# Patient Record
Sex: MALE | Race: WHITE | NOT HISPANIC OR LATINO | Employment: OTHER | URBAN - METROPOLITAN AREA
[De-identification: names, ages, dates, MRNs, and addresses within clinical notes are randomized per-mention and may not be internally consistent; named-entity substitution may affect disease eponyms.]

---

## 2019-10-07 LAB — HCV AB SER-ACNC: NEGATIVE

## 2019-10-17 LAB — HBA1C MFR BLD HPLC: 5.7 %

## 2020-10-05 ENCOUNTER — OFFICE VISIT (OUTPATIENT)
Dept: FAMILY MEDICINE CLINIC | Facility: CLINIC | Age: 80
End: 2020-10-05
Payer: MEDICARE

## 2020-10-05 VITALS
HEART RATE: 69 BPM | WEIGHT: 172.2 LBS | SYSTOLIC BLOOD PRESSURE: 130 MMHG | BODY MASS INDEX: 26.1 KG/M2 | DIASTOLIC BLOOD PRESSURE: 70 MMHG | RESPIRATION RATE: 16 BRPM | TEMPERATURE: 97.6 F | OXYGEN SATURATION: 95 % | HEIGHT: 68 IN

## 2020-10-05 DIAGNOSIS — F32.A MILD DEPRESSION: ICD-10-CM

## 2020-10-05 DIAGNOSIS — E78.2 MIXED HYPERLIPIDEMIA: Primary | ICD-10-CM

## 2020-10-05 DIAGNOSIS — C61 PROSTATE CANCER (HCC): ICD-10-CM

## 2020-10-05 DIAGNOSIS — K22.70 BARRETT'S ESOPHAGUS WITHOUT DYSPLASIA: ICD-10-CM

## 2020-10-05 PROBLEM — G89.29 CHRONIC BILATERAL LOW BACK PAIN WITHOUT SCIATICA: Status: ACTIVE | Noted: 2020-10-05

## 2020-10-05 PROBLEM — M54.50 CHRONIC BILATERAL LOW BACK PAIN WITHOUT SCIATICA: Status: ACTIVE | Noted: 2020-10-05

## 2020-10-05 PROBLEM — K22.719 BARRETT'S ESOPHAGUS WITH DYSPLASIA: Status: ACTIVE | Noted: 2020-10-05

## 2020-10-05 PROBLEM — G25.81 RESTLESS LEG SYNDROME: Status: ACTIVE | Noted: 2020-10-05

## 2020-10-05 PROCEDURE — 99203 OFFICE O/P NEW LOW 30 MIN: CPT | Performed by: FAMILY MEDICINE

## 2020-10-05 RX ORDER — ROSUVASTATIN CALCIUM 10 MG/1
5 TABLET, COATED ORAL DAILY
Qty: 90 TABLET | Refills: 3 | Status: SHIPPED | OUTPATIENT
Start: 2020-10-05 | End: 2020-10-05 | Stop reason: ALTCHOICE

## 2020-10-05 RX ORDER — ROPINIROLE 2 MG/1
2 TABLET, FILM COATED, EXTENDED RELEASE ORAL DAILY
COMMUNITY
Start: 2020-09-25 | End: 2021-04-27

## 2020-10-05 RX ORDER — TADALAFIL 5 MG/1
5 TABLET ORAL DAILY
COMMUNITY
Start: 2020-08-19

## 2020-10-05 RX ORDER — BUPROPION HYDROCHLORIDE 300 MG/1
TABLET ORAL
COMMUNITY
Start: 2020-09-27

## 2020-10-05 RX ORDER — CLONAZEPAM 0.5 MG/1
0.5 TABLET ORAL
COMMUNITY

## 2020-10-05 RX ORDER — ROPINIROLE 0.5 MG/1
1 TABLET, FILM COATED ORAL DAILY
COMMUNITY
Start: 2020-09-05 | End: 2020-12-27 | Stop reason: HOSPADM

## 2020-10-05 RX ORDER — PANTOPRAZOLE SODIUM 40 MG/1
40 TABLET, DELAYED RELEASE ORAL DAILY
COMMUNITY
Start: 2020-07-30

## 2020-10-05 RX ORDER — ROSUVASTATIN CALCIUM 5 MG/1
5 TABLET, COATED ORAL DAILY
Qty: 90 TABLET | Refills: 3 | Status: SHIPPED | OUTPATIENT
Start: 2020-10-05 | End: 2020-12-03

## 2020-11-27 DIAGNOSIS — Z00.00 WELL ADULT EXAM: Primary | ICD-10-CM

## 2020-12-02 ENCOUNTER — TELEPHONE (OUTPATIENT)
Dept: FAMILY MEDICINE CLINIC | Facility: CLINIC | Age: 80
End: 2020-12-02

## 2020-12-03 ENCOUNTER — HOSPITAL ENCOUNTER (OUTPATIENT)
Dept: ULTRASOUND IMAGING | Facility: HOSPITAL | Age: 80
Discharge: HOME | End: 2020-12-03

## 2020-12-03 ENCOUNTER — APPOINTMENT (OUTPATIENT)
Dept: LAB | Facility: CLINIC | Age: 80
End: 2020-12-03

## 2020-12-03 ENCOUNTER — OFFICE VISIT (OUTPATIENT)
Dept: FAMILY MEDICINE CLINIC | Facility: CLINIC | Age: 80
End: 2020-12-03

## 2020-12-03 ENCOUNTER — TRANSCRIBE ORDERS (OUTPATIENT)
Dept: LAB | Facility: CLINIC | Age: 80
End: 2020-12-03

## 2020-12-03 ENCOUNTER — HOSPITAL ENCOUNTER (OUTPATIENT)
Dept: NON INVASIVE DIAGNOSTICS | Facility: CLINIC | Age: 80
Discharge: HOME | End: 2020-12-03

## 2020-12-03 ENCOUNTER — HOSPITAL ENCOUNTER (OUTPATIENT)
Dept: CT IMAGING | Facility: HOSPITAL | Age: 80
Discharge: HOME | End: 2020-12-03

## 2020-12-03 VITALS
WEIGHT: 170 LBS | HEART RATE: 63 BPM | RESPIRATION RATE: 12 BRPM | OXYGEN SATURATION: 99 % | TEMPERATURE: 96.1 F | HEIGHT: 68 IN | DIASTOLIC BLOOD PRESSURE: 68 MMHG | BODY MASS INDEX: 25.76 KG/M2 | SYSTOLIC BLOOD PRESSURE: 120 MMHG

## 2020-12-03 DIAGNOSIS — R73.9 HYPERGLYCEMIA: ICD-10-CM

## 2020-12-03 DIAGNOSIS — G25.81 RESTLESS LEG SYNDROME: ICD-10-CM

## 2020-12-03 DIAGNOSIS — F51.01 PRIMARY INSOMNIA: ICD-10-CM

## 2020-12-03 DIAGNOSIS — N52.32 ERECTILE DYSFUNCTION AFTER RADICAL CYSTECTOMY: ICD-10-CM

## 2020-12-03 DIAGNOSIS — I77.810 ASCENDING AORTA DILATATION (HCC): ICD-10-CM

## 2020-12-03 DIAGNOSIS — I65.23 BILATERAL CAROTID ARTERY STENOSIS: ICD-10-CM

## 2020-12-03 DIAGNOSIS — Z00.00 WELL ADULT EXAM: ICD-10-CM

## 2020-12-03 DIAGNOSIS — M54.50 CHRONIC BILATERAL LOW BACK PAIN WITHOUT SCIATICA: ICD-10-CM

## 2020-12-03 DIAGNOSIS — E78.2 MIXED HYPERLIPIDEMIA: ICD-10-CM

## 2020-12-03 DIAGNOSIS — I77.1 STENOSIS OF LEFT ILIAC ARTERY (HCC): ICD-10-CM

## 2020-12-03 DIAGNOSIS — K44.9 HIATAL HERNIA: ICD-10-CM

## 2020-12-03 DIAGNOSIS — M67.911 TENDINOPATHY OF RIGHT ROTATOR CUFF: ICD-10-CM

## 2020-12-03 DIAGNOSIS — R93.1 ELEVATED CORONARY ARTERY CALCIUM SCORE: ICD-10-CM

## 2020-12-03 DIAGNOSIS — F32.A MILD DEPRESSION: ICD-10-CM

## 2020-12-03 DIAGNOSIS — G89.29 CHRONIC BILATERAL LOW BACK PAIN WITHOUT SCIATICA: ICD-10-CM

## 2020-12-03 DIAGNOSIS — C61 PROSTATE CANCER (HCC): ICD-10-CM

## 2020-12-03 DIAGNOSIS — K22.70 BARRETT'S ESOPHAGUS WITHOUT DYSPLASIA: ICD-10-CM

## 2020-12-03 DIAGNOSIS — Z00.00 WELL ADULT ON ROUTINE HEALTH CHECK: Primary | ICD-10-CM

## 2020-12-03 PROBLEM — N52.39 POST-PROCEDURAL ERECTILE DYSFUNCTION: Status: ACTIVE | Noted: 2020-12-03

## 2020-12-03 LAB
25(OH)D3 SERPL-MCNC: 52 NG/ML (ref 30–100)
ALBUMIN SERPL BCP-MCNC: 3.5 G/DL (ref 3.5–5)
ALP SERPL-CCNC: 68 U/L (ref 46–116)
ALT SERPL W P-5'-P-CCNC: 49 U/L (ref 12–78)
ANION GAP SERPL CALCULATED.3IONS-SCNC: 8 MMOL/L (ref 4–13)
AST SERPL W P-5'-P-CCNC: 40 U/L (ref 5–45)
ATRIAL RATE: 58 BPM
BACTERIA UR QL AUTO: ABNORMAL /HPF
BASOPHILS # BLD AUTO: 0.04 THOUSANDS/ΜL (ref 0–0.1)
BASOPHILS NFR BLD AUTO: 1 % (ref 0–1)
BILIRUB SERPL-MCNC: 0.69 MG/DL (ref 0.2–1)
BILIRUB UR QL STRIP: ABNORMAL
BUN SERPL-MCNC: 17 MG/DL (ref 5–25)
CALCIUM SERPL-MCNC: 8.6 MG/DL (ref 8.3–10.1)
CHLORIDE SERPL-SCNC: 103 MMOL/L (ref 100–108)
CHOLEST SERPL-MCNC: 130 MG/DL (ref 50–200)
CLARITY UR: CLEAR
CO2 SERPL-SCNC: 25 MMOL/L (ref 21–32)
COLOR UR: YELLOW
CREAT SERPL-MCNC: 1.03 MG/DL (ref 0.6–1.3)
CRP SERPL HS-MCNC: 2.19 MG/L
EOSINOPHIL # BLD AUTO: 0.14 THOUSAND/ΜL (ref 0–0.61)
EOSINOPHIL NFR BLD AUTO: 3 % (ref 0–6)
ERYTHROCYTE [DISTWIDTH] IN BLOOD BY AUTOMATED COUNT: 14 % (ref 11.6–15.1)
EST. AVERAGE GLUCOSE BLD GHB EST-MCNC: 120 MG/DL
GFR SERPL CREATININE-BSD FRML MDRD: 69 ML/MIN/1.73SQ M
GLUCOSE P FAST SERPL-MCNC: 111 MG/DL (ref 65–99)
GLUCOSE UR STRIP-MCNC: NEGATIVE MG/DL
HBA1C MFR BLD: 5.8 %
HCT VFR BLD AUTO: 45.1 % (ref 36.5–49.3)
HDLC SERPL-MCNC: 59 MG/DL
HGB BLD-MCNC: 14.8 G/DL (ref 12–17)
HGB UR QL STRIP.AUTO: NEGATIVE
IMM GRANULOCYTES # BLD AUTO: 0.01 THOUSAND/UL (ref 0–0.2)
IMM GRANULOCYTES NFR BLD AUTO: 0 % (ref 0–2)
KETONES UR STRIP-MCNC: NEGATIVE MG/DL
LDLC SERPL CALC-MCNC: 58 MG/DL (ref 0–100)
LEUKOCYTE ESTERASE UR QL STRIP: NEGATIVE
LYMPHOCYTES # BLD AUTO: 0.99 THOUSANDS/ΜL (ref 0.6–4.47)
LYMPHOCYTES NFR BLD AUTO: 20 % (ref 14–44)
MCH RBC QN AUTO: 32 PG (ref 26.8–34.3)
MCHC RBC AUTO-ENTMCNC: 32.8 G/DL (ref 31.4–37.4)
MCV RBC AUTO: 97 FL (ref 82–98)
MONOCYTES # BLD AUTO: 0.62 THOUSAND/ΜL (ref 0.17–1.22)
MONOCYTES NFR BLD AUTO: 13 % (ref 4–12)
NEUTROPHILS # BLD AUTO: 3.09 THOUSANDS/ΜL (ref 1.85–7.62)
NEUTS SEG NFR BLD AUTO: 63 % (ref 43–75)
NITRITE UR QL STRIP: NEGATIVE
NON-SQ EPI CELLS URNS QL MICRO: ABNORMAL /HPF
NRBC BLD AUTO-RTO: 0 /100 WBCS
P AXIS: 53 DEGREES
PH UR STRIP.AUTO: 8 [PH]
PLATELET # BLD AUTO: 204 THOUSANDS/UL (ref 149–390)
PMV BLD AUTO: 9 FL (ref 8.9–12.7)
POTASSIUM SERPL-SCNC: 4.3 MMOL/L (ref 3.5–5.3)
PR INTERVAL: 232 MS
PROT SERPL-MCNC: 7.1 G/DL (ref 6.4–8.2)
PROT UR STRIP-MCNC: NEGATIVE MG/DL
PSA SERPL-MCNC: <0.1 NG/ML (ref 0–4)
QRS AXIS: -11 DEGREES
QRSD INTERVAL: 94 MS
QT INTERVAL: 406 MS
QTC INTERVAL: 398 MS
RBC # BLD AUTO: 4.63 MILLION/UL (ref 3.88–5.62)
RBC #/AREA URNS AUTO: ABNORMAL /HPF
SODIUM SERPL-SCNC: 136 MMOL/L (ref 136–145)
SP GR UR STRIP.AUTO: 1.02 (ref 1–1.03)
T WAVE AXIS: 18 DEGREES
TRIGL SERPL-MCNC: 66 MG/DL
TSH SERPL DL<=0.05 MIU/L-ACNC: 1.36 UIU/ML (ref 0.36–3.74)
UROBILINOGEN UR QL STRIP.AUTO: 0.2 E.U./DL
VENTRICULAR RATE: 58 BPM
WBC # BLD AUTO: 4.89 THOUSAND/UL (ref 4.31–10.16)
WBC #/AREA URNS AUTO: ABNORMAL /HPF

## 2020-12-03 PROCEDURE — 75571 CT HRT W/O DYE W/CA TEST: CPT

## 2020-12-03 PROCEDURE — 80061 LIPID PANEL: CPT

## 2020-12-03 PROCEDURE — 80053 COMPREHEN METABOLIC PANEL: CPT

## 2020-12-03 PROCEDURE — VASC: Performed by: SURGERY

## 2020-12-03 PROCEDURE — 86141 C-REACTIVE PROTEIN HS: CPT

## 2020-12-03 PROCEDURE — 36415 COLL VENOUS BLD VENIPUNCTURE: CPT

## 2020-12-03 PROCEDURE — 93306 TTE W/DOPPLER COMPLETE: CPT

## 2020-12-03 PROCEDURE — 93350 STRESS TTE ONLY: CPT

## 2020-12-03 PROCEDURE — 83036 HEMOGLOBIN GLYCOSYLATED A1C: CPT

## 2020-12-03 PROCEDURE — 93922 UPR/L XTREMITY ART 2 LEVELS: CPT

## 2020-12-03 PROCEDURE — 82306 VITAMIN D 25 HYDROXY: CPT

## 2020-12-03 PROCEDURE — 93005 ELECTROCARDIOGRAM TRACING: CPT

## 2020-12-03 PROCEDURE — G1004 CDSM NDSC: HCPCS

## 2020-12-03 PROCEDURE — 84443 ASSAY THYROID STIM HORMONE: CPT

## 2020-12-03 PROCEDURE — 93351 STRESS TTE COMPLETE: CPT | Performed by: INTERNAL MEDICINE

## 2020-12-03 PROCEDURE — 76700 US EXAM ABDOM COMPLETE: CPT

## 2020-12-03 PROCEDURE — 81001 URINALYSIS AUTO W/SCOPE: CPT

## 2020-12-03 PROCEDURE — 93010 ELECTROCARDIOGRAM REPORT: CPT | Performed by: INTERNAL MEDICINE

## 2020-12-03 PROCEDURE — 85025 COMPLETE CBC W/AUTO DIFF WBC: CPT

## 2020-12-03 PROCEDURE — 84153 ASSAY OF PSA TOTAL: CPT

## 2020-12-03 PROCEDURE — 93306 TTE W/DOPPLER COMPLETE: CPT | Performed by: INTERNAL MEDICINE

## 2020-12-03 PROCEDURE — 99499EX: Performed by: FAMILY MEDICINE

## 2020-12-03 RX ORDER — ASPIRIN 81 MG/1
81 TABLET ORAL DAILY
Start: 2020-12-03 | End: 2021-01-04

## 2020-12-03 RX ORDER — CYANOCOBALAMIN (VITAMIN B-12) 1000 MCG
TABLET ORAL
Start: 2020-12-03

## 2020-12-03 RX ORDER — MAGNESIUM OXIDE/MAG AA CHELATE 300 MG
CAPSULE ORAL
Refills: 0
Start: 2020-12-03

## 2020-12-03 RX ORDER — ROSUVASTATIN CALCIUM 10 MG/1
10 TABLET, COATED ORAL DAILY
Qty: 90 TABLET | Refills: 3 | Status: SHIPPED | OUTPATIENT
Start: 2020-12-03 | End: 2022-03-22

## 2020-12-03 RX ORDER — NIACIN 500 MG
500 TABLET ORAL
Start: 2020-12-03

## 2020-12-03 RX ORDER — GABAPENTIN 100 MG/1
CAPSULE ORAL
Qty: 90 CAPSULE | Refills: 3 | Status: SHIPPED | OUTPATIENT
Start: 2020-12-03 | End: 2021-05-14

## 2020-12-03 RX ORDER — MELATONIN
1000 DAILY
Start: 2020-12-03

## 2020-12-11 LAB
CHEST PAIN STATEMENT: NORMAL
MAX DIASTOLIC BP: 80 MMHG
MAX HEART RATE: 136 BPM
MAX PREDICTED HEART RATE: 141 BPM
MAX. SYSTOLIC BP: 198 MMHG
PROTOCOL NAME: NORMAL
REASON FOR TERMINATION: NORMAL
TARGET HR FORMULA: NORMAL
TEST INDICATION: NORMAL
TIME IN EXERCISE PHASE: NORMAL

## 2020-12-23 ENCOUNTER — ANESTHESIA (INPATIENT)
Dept: PERIOP | Facility: HOSPITAL | Age: 80
DRG: 470 | End: 2020-12-23
Payer: MEDICARE

## 2020-12-23 ENCOUNTER — APPOINTMENT (INPATIENT)
Dept: RADIOLOGY | Facility: HOSPITAL | Age: 80
DRG: 470 | End: 2020-12-23
Payer: MEDICARE

## 2020-12-23 ENCOUNTER — TELEPHONE (OUTPATIENT)
Dept: FAMILY MEDICINE CLINIC | Facility: CLINIC | Age: 80
End: 2020-12-23

## 2020-12-23 ENCOUNTER — APPOINTMENT (EMERGENCY)
Dept: RADIOLOGY | Facility: HOSPITAL | Age: 80
DRG: 470 | End: 2020-12-23
Payer: MEDICARE

## 2020-12-23 ENCOUNTER — ANESTHESIA EVENT (INPATIENT)
Dept: PERIOP | Facility: HOSPITAL | Age: 80
DRG: 470 | End: 2020-12-23
Payer: MEDICARE

## 2020-12-23 ENCOUNTER — HOSPITAL ENCOUNTER (INPATIENT)
Facility: HOSPITAL | Age: 80
LOS: 4 days | Discharge: HOME | DRG: 470 | End: 2020-12-27
Attending: EMERGENCY MEDICINE | Admitting: INTERNAL MEDICINE
Payer: MEDICARE

## 2020-12-23 VITALS — HEART RATE: 68 BPM

## 2020-12-23 DIAGNOSIS — S72.002A CLOSED FRACTURE OF NECK OF LEFT FEMUR, INITIAL ENCOUNTER (HCC): Primary | ICD-10-CM

## 2020-12-23 DIAGNOSIS — M25.559 HIP PAIN: ICD-10-CM

## 2020-12-23 DIAGNOSIS — Z96.642 STATUS POST TOTAL HIP REPLACEMENT, LEFT: ICD-10-CM
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
## 2020-12-23 DIAGNOSIS — S72.002A FRACTURE OF FEMORAL NECK, LEFT (HCC): ICD-10-CM

## 2020-12-23 PROBLEM — Z01.818 PREOPERATIVE EXAMINATION: Status: ACTIVE | Noted: 2020-12-23

## 2020-12-23 PROBLEM — E78.5 HYPERLIPIDEMIA, UNSPECIFIED: Status: ACTIVE | Noted: 2020-10-05

## 2020-12-23 LAB
25(OH)D3 SERPL-MCNC: 53.1 NG/ML (ref 30–100)
ABO GROUP BLD: NORMAL
ABO GROUP BLD: NORMAL
ALBUMIN SERPL BCP-MCNC: 3.7 G/DL (ref 3.5–5)
ALP SERPL-CCNC: 85 U/L (ref 46–116)
ALT SERPL W P-5'-P-CCNC: 45 U/L (ref 12–78)
ANION GAP SERPL CALCULATED.3IONS-SCNC: 4 MMOL/L (ref 4–13)
APTT PPP: 28 SECONDS (ref 23–37)
AST SERPL W P-5'-P-CCNC: 34 U/L (ref 5–45)
ATRIAL RATE: 65 BPM
BACTERIA UR QL AUTO: ABNORMAL /HPF
BASOPHILS # BLD AUTO: 0.04 THOUSANDS/ΜL (ref 0–0.1)
BASOPHILS NFR BLD AUTO: 1 % (ref 0–1)
BILIRUB SERPL-MCNC: 0.22 MG/DL (ref 0.2–1)
BILIRUB UR QL STRIP: NEGATIVE
BLD GP AB SCN SERPL QL: NEGATIVE
BUN SERPL-MCNC: 24 MG/DL (ref 5–25)
CALCIUM SERPL-MCNC: 9.3 MG/DL (ref 8.3–10.1)
CHLORIDE SERPL-SCNC: 110 MMOL/L (ref 100–108)
CLARITY UR: ABNORMAL
CO2 SERPL-SCNC: 28 MMOL/L (ref 21–32)
COLOR UR: YELLOW
CREAT SERPL-MCNC: 1.01 MG/DL (ref 0.6–1.3)
EOSINOPHIL # BLD AUTO: 0.12 THOUSAND/ΜL (ref 0–0.61)
EOSINOPHIL NFR BLD AUTO: 2 % (ref 0–6)
ERYTHROCYTE [DISTWIDTH] IN BLOOD BY AUTOMATED COUNT: 14 % (ref 11.6–15.1)
ERYTHROCYTE [SEDIMENTATION RATE] IN BLOOD: 24 MM/HOUR (ref 0–19)
FLUAV RNA RESP QL NAA+PROBE: NEGATIVE
FLUBV RNA RESP QL NAA+PROBE: NEGATIVE
GFR SERPL CREATININE-BSD FRML MDRD: 70 ML/MIN/1.73SQ M
GLUCOSE SERPL-MCNC: 100 MG/DL (ref 65–140)
GLUCOSE SERPL-MCNC: 133 MG/DL (ref 65–140)
GLUCOSE UR STRIP-MCNC: NEGATIVE MG/DL
HCT VFR BLD AUTO: 42.2 % (ref 36.5–49.3)
HGB BLD-MCNC: 13.9 G/DL (ref 12–17)
HGB UR QL STRIP.AUTO: ABNORMAL
HYALINE CASTS #/AREA URNS LPF: ABNORMAL /LPF
IMM GRANULOCYTES # BLD AUTO: 0.01 THOUSAND/UL (ref 0–0.2)
IMM GRANULOCYTES NFR BLD AUTO: 0 % (ref 0–2)
INR PPP: 1.07 (ref 0.84–1.19)
KETONES UR STRIP-MCNC: NEGATIVE MG/DL
LEUKOCYTE ESTERASE UR QL STRIP: NEGATIVE
LYMPHOCYTES # BLD AUTO: 1.1 THOUSANDS/ΜL (ref 0.6–4.47)
LYMPHOCYTES NFR BLD AUTO: 17 % (ref 14–44)
MAGNESIUM SERPL-MCNC: 2.3 MG/DL (ref 1.6–2.6)
MCH RBC QN AUTO: 32.1 PG (ref 26.8–34.3)
MCHC RBC AUTO-ENTMCNC: 32.9 G/DL (ref 31.4–37.4)
MCV RBC AUTO: 98 FL (ref 82–98)
MONOCYTES # BLD AUTO: 0.67 THOUSAND/ΜL (ref 0.17–1.22)
MONOCYTES NFR BLD AUTO: 11 % (ref 4–12)
NEUTROPHILS # BLD AUTO: 4.46 THOUSANDS/ΜL (ref 1.85–7.62)
NEUTS SEG NFR BLD AUTO: 69 % (ref 43–75)
NITRITE UR QL STRIP: NEGATIVE
NON-SQ EPI CELLS URNS QL MICRO: ABNORMAL /HPF
NRBC BLD AUTO-RTO: 0 /100 WBCS
P AXIS: 60 DEGREES
PH UR STRIP.AUTO: 7.5 [PH]
PHOSPHATE SERPL-MCNC: 3.6 MG/DL (ref 2.3–4.1)
PLATELET # BLD AUTO: 209 THOUSANDS/UL (ref 149–390)
PMV BLD AUTO: 9.6 FL (ref 8.9–12.7)
POTASSIUM SERPL-SCNC: 4.5 MMOL/L (ref 3.5–5.3)
PR INTERVAL: 240 MS
PROT SERPL-MCNC: 7.3 G/DL (ref 6.4–8.2)
PROT UR STRIP-MCNC: NEGATIVE MG/DL
PROTHROMBIN TIME: 13.9 SECONDS (ref 11.6–14.5)
PTH-INTACT SERPL-MCNC: 44.7 PG/ML (ref 18.4–80.1)
QRS AXIS: 9 DEGREES
QRSD INTERVAL: 96 MS
QT INTERVAL: 392 MS
QTC INTERVAL: 407 MS
RBC # BLD AUTO: 4.33 MILLION/UL (ref 3.88–5.62)
RBC #/AREA URNS AUTO: ABNORMAL /HPF
RH BLD: NEGATIVE
RH BLD: NEGATIVE
RSV RNA RESP QL NAA+PROBE: NEGATIVE
SARS-COV-2 RNA RESP QL NAA+PROBE: NEGATIVE
SODIUM SERPL-SCNC: 142 MMOL/L (ref 136–145)
SP GR UR STRIP.AUTO: 1.02 (ref 1–1.03)
SPECIMEN EXPIRATION DATE: NORMAL
T WAVE AXIS: 58 DEGREES
TSH SERPL DL<=0.05 MIU/L-ACNC: 2.46 UIU/ML (ref 0.36–3.74)
UROBILINOGEN UR QL STRIP.AUTO: 0.2 E.U./DL
VENTRICULAR RATE: 65 BPM
WBC # BLD AUTO: 6.4 THOUSAND/UL (ref 4.31–10.16)
WBC #/AREA URNS AUTO: ABNORMAL /HPF

## 2020-12-23 PROCEDURE — 0241U HB NFCT DS VIR RESP RNA 4 TRGT: CPT | Performed by: ORTHOPAEDIC SURGERY

## 2020-12-23 PROCEDURE — 27130 TOTAL HIP ARTHROPLASTY: CPT | Performed by: ORTHOPAEDIC SURGERY

## 2020-12-23 PROCEDURE — 86900 BLOOD TYPING SEROLOGIC ABO: CPT | Performed by: INTERNAL MEDICINE

## 2020-12-23 PROCEDURE — 81001 URINALYSIS AUTO W/SCOPE: CPT | Performed by: INTERNAL MEDICINE

## 2020-12-23 PROCEDURE — 1124F ACP DISCUSS-NO DSCNMKR DOCD: CPT | Performed by: EMERGENCY MEDICINE

## 2020-12-23 PROCEDURE — 82306 VITAMIN D 25 HYDROXY: CPT | Performed by: ORTHOPAEDIC SURGERY

## 2020-12-23 PROCEDURE — 99222 1ST HOSP IP/OBS MODERATE 55: CPT | Performed by: INTERNAL MEDICINE

## 2020-12-23 PROCEDURE — 93005 ELECTROCARDIOGRAM TRACING: CPT

## 2020-12-23 PROCEDURE — 99223 1ST HOSP IP/OBS HIGH 75: CPT | Performed by: INTERNAL MEDICINE

## 2020-12-23 PROCEDURE — C1776 JOINT DEVICE (IMPLANTABLE): HCPCS | Performed by: ORTHOPAEDIC SURGERY

## 2020-12-23 PROCEDURE — G9197 ORDER FOR CEPH: HCPCS | Performed by: ORTHOPAEDIC SURGERY

## 2020-12-23 PROCEDURE — 0SRB0JA REPLACEMENT OF LEFT HIP JOINT WITH SYNTHETIC SUBSTITUTE, UNCEMENTED, OPEN APPROACH: ICD-10-PCS | Performed by: ORTHOPAEDIC SURGERY

## 2020-12-23 PROCEDURE — 83970 ASSAY OF PARATHORMONE: CPT | Performed by: ORTHOPAEDIC SURGERY

## 2020-12-23 PROCEDURE — 86901 BLOOD TYPING SEROLOGIC RH(D): CPT | Performed by: INTERNAL MEDICINE

## 2020-12-23 PROCEDURE — 85730 THROMBOPLASTIN TIME PARTIAL: CPT | Performed by: INTERNAL MEDICINE

## 2020-12-23 PROCEDURE — 80053 COMPREHEN METABOLIC PANEL: CPT | Performed by: INTERNAL MEDICINE

## 2020-12-23 PROCEDURE — 73501 X-RAY EXAM HIP UNI 1 VIEW: CPT

## 2020-12-23 PROCEDURE — 83735 ASSAY OF MAGNESIUM: CPT | Performed by: INTERNAL MEDICINE

## 2020-12-23 PROCEDURE — 93010 ELECTROCARDIOGRAM REPORT: CPT | Performed by: INTERNAL MEDICINE

## 2020-12-23 PROCEDURE — 71045 X-RAY EXAM CHEST 1 VIEW: CPT

## 2020-12-23 PROCEDURE — 73552 X-RAY EXAM OF FEMUR 2/>: CPT

## 2020-12-23 PROCEDURE — 99232 SBSQ HOSP IP/OBS MODERATE 35: CPT | Performed by: INTERNAL MEDICINE

## 2020-12-23 PROCEDURE — 85652 RBC SED RATE AUTOMATED: CPT | Performed by: ORTHOPAEDIC SURGERY

## 2020-12-23 PROCEDURE — 99285 EMERGENCY DEPT VISIT HI MDM: CPT

## 2020-12-23 PROCEDURE — 84443 ASSAY THYROID STIM HORMONE: CPT | Performed by: ORTHOPAEDIC SURGERY

## 2020-12-23 PROCEDURE — 36415 COLL VENOUS BLD VENIPUNCTURE: CPT | Performed by: INTERNAL MEDICINE

## 2020-12-23 PROCEDURE — NC001 PR NO CHARGE: Performed by: ORTHOPAEDIC SURGERY

## 2020-12-23 PROCEDURE — 86850 RBC ANTIBODY SCREEN: CPT | Performed by: INTERNAL MEDICINE

## 2020-12-23 PROCEDURE — 85610 PROTHROMBIN TIME: CPT | Performed by: INTERNAL MEDICINE

## 2020-12-23 PROCEDURE — 99285 EMERGENCY DEPT VISIT HI MDM: CPT | Performed by: EMERGENCY MEDICINE

## 2020-12-23 PROCEDURE — 73502 X-RAY EXAM HIP UNI 2-3 VIEWS: CPT

## 2020-12-23 PROCEDURE — 84100 ASSAY OF PHOSPHORUS: CPT | Performed by: INTERNAL MEDICINE

## 2020-12-23 PROCEDURE — 82948 REAGENT STRIP/BLOOD GLUCOSE: CPT

## 2020-12-23 PROCEDURE — 85025 COMPLETE CBC W/AUTO DIFF WBC: CPT | Performed by: INTERNAL MEDICINE

## 2020-12-23 DEVICE — ACTIS DUOFIX HIP PROSTHESIS (FEMORAL STEM 12/14 TAPER CEMENTLESS SIZE 6 STD COLLAR)  CE
Type: IMPLANTABLE DEVICE | Site: HIP | Status: FUNCTIONAL
Brand: ACTIS

## 2020-12-23 DEVICE — PINNACLE HIP SOLUTIONS ALTRX POLYETHYLENE ACETABULAR LINER NEUTRAL 36MM ID 52MM OD
Type: IMPLANTABLE DEVICE | Site: HIP | Status: FUNCTIONAL
Brand: PINNACLE ALTRX

## 2020-12-23 DEVICE — M-SPEC METAL FEMORAL HEAD 12/14 TAPER DIAMETER 36MM +1.5: Type: IMPLANTABLE DEVICE | Site: HIP | Status: FUNCTIONAL

## 2020-12-23 DEVICE — PINNACLE POROCOAT ACETABULAR SHELL SECTOR II 52MM OD
Type: IMPLANTABLE DEVICE | Site: HIP | Status: FUNCTIONAL
Brand: PINNACLE POROCOAT

## 2020-12-23 RX ORDER — OXYCODONE HYDROCHLORIDE 5 MG/1
2.5 TABLET ORAL EVERY 4 HOURS PRN
Status: DISCONTINUED | OUTPATIENT
Start: 2020-12-23 | End: 2020-12-27 | Stop reason: HOSPADM

## 2020-12-23 RX ORDER — NIACIN 100 MG
500 TABLET ORAL
Status: DISCONTINUED | OUTPATIENT
Start: 2020-12-23 | End: 2020-12-27 | Stop reason: HOSPADM

## 2020-12-23 RX ORDER — DOCUSATE SODIUM 100 MG/1
100 CAPSULE, LIQUID FILLED ORAL 2 TIMES DAILY
Status: DISCONTINUED | OUTPATIENT
Start: 2020-12-23 | End: 2020-12-27 | Stop reason: HOSPADM

## 2020-12-23 RX ORDER — SENNOSIDES 8.6 MG
1 TABLET ORAL DAILY
Status: DISCONTINUED | OUTPATIENT
Start: 2020-12-23 | End: 2020-12-27 | Stop reason: HOSPADM

## 2020-12-23 RX ORDER — LIDOCAINE HYDROCHLORIDE AND EPINEPHRINE 10; 10 MG/ML; UG/ML
INJECTION, SOLUTION INFILTRATION; PERINEURAL AS NEEDED
Status: DISCONTINUED | OUTPATIENT
Start: 2020-12-23 | End: 2020-12-23 | Stop reason: HOSPADM

## 2020-12-23 RX ORDER — HYDROMORPHONE HCL/PF 1 MG/ML
0.2 SYRINGE (ML) INJECTION EVERY 4 HOURS PRN
Status: DISCONTINUED | OUTPATIENT
Start: 2020-12-23 | End: 2020-12-27 | Stop reason: HOSPADM

## 2020-12-23 RX ORDER — ACETAMINOPHEN 325 MG/1
650 TABLET ORAL EVERY 6 HOURS PRN
Status: DISCONTINUED | OUTPATIENT
Start: 2020-12-23 | End: 2020-12-27 | Stop reason: HOSPADM

## 2020-12-23 RX ORDER — FENTANYL CITRATE 50 UG/ML
INJECTION, SOLUTION INTRAMUSCULAR; INTRAVENOUS AS NEEDED
Status: DISCONTINUED | OUTPATIENT
Start: 2020-12-23 | End: 2020-12-23

## 2020-12-23 RX ORDER — ZINC SULFATE 50(220)MG
220 CAPSULE ORAL DAILY
Status: DISCONTINUED | OUTPATIENT
Start: 2020-12-23 | End: 2020-12-27 | Stop reason: HOSPADM

## 2020-12-23 RX ORDER — ROCURONIUM BROMIDE 10 MG/ML
INJECTION, SOLUTION INTRAVENOUS AS NEEDED
Status: DISCONTINUED | OUTPATIENT
Start: 2020-12-23 | End: 2020-12-23

## 2020-12-23 RX ORDER — MELATONIN
1000 DAILY
Status: DISCONTINUED | OUTPATIENT
Start: 2020-12-23 | End: 2020-12-27 | Stop reason: HOSPADM

## 2020-12-23 RX ORDER — PROPOFOL 10 MG/ML
INJECTION, EMULSION INTRAVENOUS AS NEEDED
Status: DISCONTINUED | OUTPATIENT
Start: 2020-12-23 | End: 2020-12-23

## 2020-12-23 RX ORDER — DEXTROSE, SODIUM CHLORIDE, AND POTASSIUM CHLORIDE 5; .9; .15 G/100ML; G/100ML; G/100ML
125 INJECTION INTRAVENOUS CONTINUOUS
Status: DISCONTINUED | OUTPATIENT
Start: 2020-12-23 | End: 2020-12-23

## 2020-12-23 RX ORDER — PRAVASTATIN SODIUM 80 MG/1
80 TABLET ORAL
Status: DISCONTINUED | OUTPATIENT
Start: 2020-12-23 | End: 2020-12-27 | Stop reason: HOSPADM

## 2020-12-23 RX ORDER — GLYCOPYRROLATE 0.2 MG/ML
INJECTION INTRAMUSCULAR; INTRAVENOUS AS NEEDED
Status: DISCONTINUED | OUTPATIENT
Start: 2020-12-23 | End: 2020-12-23

## 2020-12-23 RX ORDER — CEFAZOLIN SODIUM 2 G/50ML
SOLUTION INTRAVENOUS AS NEEDED
Status: DISCONTINUED | OUTPATIENT
Start: 2020-12-23 | End: 2020-12-23

## 2020-12-23 RX ORDER — CHLORAL HYDRATE 500 MG
1000 CAPSULE ORAL DAILY
Status: DISCONTINUED | OUTPATIENT
Start: 2020-12-23 | End: 2020-12-23

## 2020-12-23 RX ORDER — DOCUSATE SODIUM 100 MG/1
100 CAPSULE, LIQUID FILLED ORAL 2 TIMES DAILY
Status: DISCONTINUED | OUTPATIENT
Start: 2020-12-23 | End: 2020-12-23

## 2020-12-23 RX ORDER — CLONAZEPAM 0.5 MG/1
0.5 TABLET ORAL DAILY
Status: DISCONTINUED | OUTPATIENT
Start: 2020-12-23 | End: 2020-12-27 | Stop reason: HOSPADM

## 2020-12-23 RX ORDER — PANTOPRAZOLE SODIUM 40 MG/1
40 TABLET, DELAYED RELEASE ORAL
Status: DISCONTINUED | OUTPATIENT
Start: 2020-12-23 | End: 2020-12-27 | Stop reason: HOSPADM

## 2020-12-23 RX ORDER — ROPINIROLE 1 MG/1
1 TABLET, FILM COATED ORAL 3 TIMES DAILY
Status: DISCONTINUED | OUTPATIENT
Start: 2020-12-23 | End: 2020-12-27 | Stop reason: HOSPADM

## 2020-12-23 RX ORDER — POTASSIUM CHLORIDE 750 MG/1
10 TABLET, EXTENDED RELEASE ORAL ONCE
Status: DISCONTINUED | OUTPATIENT
Start: 2020-12-23 | End: 2020-12-23

## 2020-12-23 RX ORDER — DOCUSATE SODIUM 100 MG/1
100 CAPSULE, LIQUID FILLED ORAL 2 TIMES DAILY
Qty: 10 CAPSULE | Refills: 0 | Status: SHIPPED | OUTPATIENT
Start: 2020-12-23 | End: 2021-01-04

## 2020-12-23 RX ORDER — ACETAMINOPHEN 325 MG/1
975 TABLET ORAL EVERY 8 HOURS SCHEDULED
Status: DISCONTINUED | OUTPATIENT
Start: 2020-12-23 | End: 2020-12-27 | Stop reason: HOSPADM

## 2020-12-23 RX ORDER — DOCUSATE SODIUM 100 MG/1
100 CAPSULE, LIQUID FILLED ORAL 2 TIMES DAILY PRN
Status: DISCONTINUED | OUTPATIENT
Start: 2020-12-23 | End: 2020-12-27 | Stop reason: HOSPADM

## 2020-12-23 RX ORDER — CHLORHEXIDINE GLUCONATE 0.12 MG/ML
15 RINSE ORAL ONCE
Status: COMPLETED | OUTPATIENT
Start: 2020-12-23 | End: 2020-12-23

## 2020-12-23 RX ORDER — ONDANSETRON 2 MG/ML
4 INJECTION INTRAMUSCULAR; INTRAVENOUS EVERY 6 HOURS PRN
Status: DISCONTINUED | OUTPATIENT
Start: 2020-12-23 | End: 2020-12-27 | Stop reason: HOSPADM

## 2020-12-23 RX ORDER — ONDANSETRON 2 MG/ML
INJECTION INTRAMUSCULAR; INTRAVENOUS AS NEEDED
Status: DISCONTINUED | OUTPATIENT
Start: 2020-12-23 | End: 2020-12-23

## 2020-12-23 RX ORDER — SENNOSIDES 8.6 MG
650 CAPSULE ORAL EVERY 8 HOURS PRN
Qty: 30 TABLET | Refills: 0 | Status: SHIPPED | OUTPATIENT
Start: 2020-12-23 | End: 2021-01-22

## 2020-12-23 RX ORDER — CEFAZOLIN SODIUM 1 G/50ML
1000 SOLUTION INTRAVENOUS EVERY 8 HOURS
Status: COMPLETED | OUTPATIENT
Start: 2020-12-23 | End: 2020-12-24

## 2020-12-23 RX ORDER — TRANEXAMIC ACID 100 MG/ML
INJECTION, SOLUTION INTRAVENOUS AS NEEDED
Status: DISCONTINUED | OUTPATIENT
Start: 2020-12-23 | End: 2020-12-23

## 2020-12-23 RX ORDER — GABAPENTIN 100 MG/1
100 CAPSULE ORAL
Status: DISCONTINUED | OUTPATIENT
Start: 2020-12-23 | End: 2020-12-27 | Stop reason: HOSPADM

## 2020-12-23 RX ORDER — ACETAMINOPHEN 325 MG/1
650 TABLET ORAL EVERY 6 HOURS PRN
Status: DISCONTINUED | OUTPATIENT
Start: 2020-12-23 | End: 2020-12-23

## 2020-12-23 RX ORDER — SODIUM CHLORIDE 9 MG/ML
100 INJECTION, SOLUTION INTRAVENOUS CONTINUOUS
Status: DISCONTINUED | OUTPATIENT
Start: 2020-12-23 | End: 2020-12-23

## 2020-12-23 RX ORDER — GABAPENTIN 100 MG/1
100 CAPSULE ORAL
Status: DISCONTINUED | OUTPATIENT
Start: 2020-12-23 | End: 2020-12-23 | Stop reason: SDUPTHER

## 2020-12-23 RX ORDER — BUPROPION HYDROCHLORIDE 150 MG/1
300 TABLET ORAL DAILY
Status: DISCONTINUED | OUTPATIENT
Start: 2020-12-23 | End: 2020-12-27 | Stop reason: HOSPADM

## 2020-12-23 RX ORDER — ROPINIROLE 1 MG/1
1 TABLET, FILM COATED ORAL DAILY
Status: DISCONTINUED | OUTPATIENT
Start: 2020-12-23 | End: 2020-12-23

## 2020-12-23 RX ORDER — CALCIUM CARBONATE 200(500)MG
1000 TABLET,CHEWABLE ORAL DAILY PRN
Status: DISCONTINUED | OUTPATIENT
Start: 2020-12-23 | End: 2020-12-27 | Stop reason: HOSPADM

## 2020-12-23 RX ORDER — MAGNESIUM HYDROXIDE/ALUMINUM HYDROXICE/SIMETHICONE 120; 1200; 1200 MG/30ML; MG/30ML; MG/30ML
30 SUSPENSION ORAL EVERY 6 HOURS PRN
Status: DISCONTINUED | OUTPATIENT
Start: 2020-12-23 | End: 2020-12-27 | Stop reason: HOSPADM

## 2020-12-23 RX ORDER — DEXAMETHASONE SODIUM PHOSPHATE 10 MG/ML
INJECTION, SOLUTION INTRAMUSCULAR; INTRAVENOUS AS NEEDED
Status: DISCONTINUED | OUTPATIENT
Start: 2020-12-23 | End: 2020-12-23

## 2020-12-23 RX ORDER — SODIUM CHLORIDE, SODIUM GLUCONATE, SODIUM ACETATE, POTASSIUM CHLORIDE, MAGNESIUM CHLORIDE, SODIUM PHOSPHATE, DIBASIC, AND POTASSIUM PHOSPHATE .53; .5; .37; .037; .03; .012; .00082 G/100ML; G/100ML; G/100ML; G/100ML; G/100ML; G/100ML; G/100ML
75 INJECTION, SOLUTION INTRAVENOUS CONTINUOUS
Status: DISCONTINUED | OUTPATIENT
Start: 2020-12-23 | End: 2020-12-24

## 2020-12-23 RX ORDER — OXYCODONE HYDROCHLORIDE 5 MG/1
5 TABLET ORAL EVERY 4 HOURS PRN
Status: DISCONTINUED | OUTPATIENT
Start: 2020-12-23 | End: 2020-12-27 | Stop reason: HOSPADM

## 2020-12-23 RX ORDER — LIDOCAINE HYDROCHLORIDE 10 MG/ML
INJECTION, SOLUTION EPIDURAL; INFILTRATION; INTRACAUDAL; PERINEURAL AS NEEDED
Status: DISCONTINUED | OUTPATIENT
Start: 2020-12-23 | End: 2020-12-23

## 2020-12-23 RX ORDER — FENTANYL CITRATE 50 UG/ML
50 INJECTION, SOLUTION INTRAMUSCULAR; INTRAVENOUS ONCE
Status: COMPLETED | OUTPATIENT
Start: 2020-12-23 | End: 2020-12-23

## 2020-12-23 RX ORDER — MIDAZOLAM HYDROCHLORIDE 2 MG/2ML
INJECTION, SOLUTION INTRAMUSCULAR; INTRAVENOUS AS NEEDED
Status: DISCONTINUED | OUTPATIENT
Start: 2020-12-23 | End: 2020-12-23

## 2020-12-23 RX ORDER — SODIUM CHLORIDE, SODIUM LACTATE, POTASSIUM CHLORIDE, CALCIUM CHLORIDE 600; 310; 30; 20 MG/100ML; MG/100ML; MG/100ML; MG/100ML
INJECTION, SOLUTION INTRAVENOUS CONTINUOUS PRN
Status: DISCONTINUED | OUTPATIENT
Start: 2020-12-23 | End: 2020-12-23

## 2020-12-23 RX ORDER — MAGNESIUM HYDROXIDE 1200 MG/15ML
LIQUID ORAL AS NEEDED
Status: DISCONTINUED | OUTPATIENT
Start: 2020-12-23 | End: 2020-12-23 | Stop reason: HOSPADM

## 2020-12-23 RX ADMIN — CEFAZOLIN SODIUM 1000 MG: 1 SOLUTION INTRAVENOUS at 18:14

## 2020-12-23 RX ADMIN — ROPINIROLE HYDROCHLORIDE 1 MG: 1 TABLET, FILM COATED ORAL at 09:03

## 2020-12-23 RX ADMIN — ROCURONIUM BROMIDE 20 MG: 50 INJECTION, SOLUTION INTRAVENOUS at 14:24

## 2020-12-23 RX ADMIN — ROPINIROLE HYDROCHLORIDE 1 MG: 1 TABLET, FILM COATED ORAL at 21:54

## 2020-12-23 RX ADMIN — ACETAMINOPHEN 975 MG: 325 TABLET, FILM COATED ORAL at 21:54

## 2020-12-23 RX ADMIN — OXYCODONE HYDROCHLORIDE 2.5 MG: 5 TABLET ORAL at 20:15

## 2020-12-23 RX ADMIN — MAGNESIUM OXIDE TAB 400 MG (241.3 MG ELEMENTAL MG) 400 MG: 400 (241.3 MG) TAB at 09:03

## 2020-12-23 RX ADMIN — SUGAMMADEX 200 MG: 100 INJECTION, SOLUTION INTRAVENOUS at 15:19

## 2020-12-23 RX ADMIN — GABAPENTIN 100 MG: 100 CAPSULE ORAL at 21:53

## 2020-12-23 RX ADMIN — PHENYLEPHRINE HYDROCHLORIDE 20 MCG/MIN: 10 INJECTION INTRAVENOUS at 13:45

## 2020-12-23 RX ADMIN — PHENYLEPHRINE HYDROCHLORIDE 100 MCG: 10 INJECTION INTRAVENOUS at 13:43

## 2020-12-23 RX ADMIN — ZINC SULFATE 220 MG (50 MG) CAPSULE 220 MG: CAPSULE at 09:03

## 2020-12-23 RX ADMIN — OXYCODONE HYDROCHLORIDE 5 MG: 5 TABLET ORAL at 04:01

## 2020-12-23 RX ADMIN — CEFAZOLIN SODIUM 2000 MG: 2 SOLUTION INTRAVENOUS at 13:35

## 2020-12-23 RX ADMIN — LIDOCAINE HYDROCHLORIDE 80 MG: 10 INJECTION, SOLUTION EPIDURAL; INFILTRATION; INTRACAUDAL; PERINEURAL at 13:43

## 2020-12-23 RX ADMIN — PROPOFOL 150 MG: 10 INJECTION, EMULSION INTRAVENOUS at 13:43

## 2020-12-23 RX ADMIN — FENTANYL CITRATE 50 MCG: 50 INJECTION INTRAMUSCULAR; INTRAVENOUS at 15:10

## 2020-12-23 RX ADMIN — Medication 60 MG: at 09:04

## 2020-12-23 RX ADMIN — CYANOCOBALAMIN TAB 500 MCG 1000 MCG: 500 TAB at 09:02

## 2020-12-23 RX ADMIN — SODIUM CHLORIDE, SODIUM GLUCONATE, SODIUM ACETATE, POTASSIUM CHLORIDE, MAGNESIUM CHLORIDE, SODIUM PHOSPHATE, DIBASIC, AND POTASSIUM PHOSPHATE 75 ML/HR: .53; .5; .37; .037; .03; .012; .00082 INJECTION, SOLUTION INTRAVENOUS at 16:06

## 2020-12-23 RX ADMIN — ROCURONIUM BROMIDE 30 MG: 50 INJECTION, SOLUTION INTRAVENOUS at 13:44

## 2020-12-23 RX ADMIN — TRANEXAMIC ACID 1000 MG: 1 INJECTION, SOLUTION INTRAVENOUS at 14:15

## 2020-12-23 RX ADMIN — Medication 1000 UNITS: at 09:02

## 2020-12-23 RX ADMIN — ONDANSETRON 4 MG: 2 INJECTION INTRAMUSCULAR; INTRAVENOUS at 14:25

## 2020-12-23 RX ADMIN — PRAVASTATIN SODIUM 80 MG: 80 TABLET ORAL at 18:08

## 2020-12-23 RX ADMIN — Medication 1000 MG: at 09:02

## 2020-12-23 RX ADMIN — PHENYLEPHRINE HYDROCHLORIDE 100 MCG: 10 INJECTION INTRAVENOUS at 13:47

## 2020-12-23 RX ADMIN — HYDROMORPHONE HYDROCHLORIDE 0.2 MG: 1 INJECTION, SOLUTION INTRAMUSCULAR; INTRAVENOUS; SUBCUTANEOUS at 05:34

## 2020-12-23 RX ADMIN — PANTOPRAZOLE SODIUM 40 MG: 40 TABLET, DELAYED RELEASE ORAL at 09:11

## 2020-12-23 RX ADMIN — FENTANYL CITRATE 50 MCG: 50 INJECTION INTRAMUSCULAR; INTRAVENOUS at 02:02

## 2020-12-23 RX ADMIN — POVIDONE-IODINE: 10 SOLUTION TOPICAL at 16:52

## 2020-12-23 RX ADMIN — DEXAMETHASONE SODIUM PHOSPHATE 10 MG: 10 INJECTION, SOLUTION INTRAMUSCULAR; INTRAVENOUS at 13:53

## 2020-12-23 RX ADMIN — FENTANYL CITRATE 50 MCG: 50 INJECTION INTRAMUSCULAR; INTRAVENOUS at 13:43

## 2020-12-23 RX ADMIN — CHLORHEXIDINE GLUCONATE 15 ML: 1.2 RINSE ORAL at 09:02

## 2020-12-23 RX ADMIN — PHENYLEPHRINE HYDROCHLORIDE 100 MCG: 10 INJECTION INTRAVENOUS at 13:45

## 2020-12-23 RX ADMIN — SODIUM CHLORIDE, SODIUM GLUCONATE, SODIUM ACETATE, POTASSIUM CHLORIDE, MAGNESIUM CHLORIDE, SODIUM PHOSPHATE, DIBASIC, AND POTASSIUM PHOSPHATE 75 ML/HR: .53; .5; .37; .037; .03; .012; .00082 INJECTION, SOLUTION INTRAVENOUS at 04:53

## 2020-12-23 RX ADMIN — SODIUM CHLORIDE, SODIUM LACTATE, POTASSIUM CHLORIDE, AND CALCIUM CHLORIDE: .6; .31; .03; .02 INJECTION, SOLUTION INTRAVENOUS at 13:34

## 2020-12-23 RX ADMIN — BUPROPION HYDROCHLORIDE 300 MG: 150 TABLET, EXTENDED RELEASE ORAL at 09:02

## 2020-12-23 RX ADMIN — ACETAMINOPHEN 975 MG: 325 TABLET, FILM COATED ORAL at 06:35

## 2020-12-23 RX ADMIN — CEFAZOLIN SODIUM 1000 MG: 1 SOLUTION INTRAVENOUS at 23:30

## 2020-12-23 RX ADMIN — Medication 500 MG: at 09:04

## 2020-12-23 RX ADMIN — MIDAZOLAM 2 MG: 1 INJECTION INTRAMUSCULAR; INTRAVENOUS at 13:30

## 2020-12-23 RX ADMIN — GLYCOPYRROLATE 0.2 MG: 0.2 INJECTION, SOLUTION INTRAMUSCULAR; INTRAVENOUS at 13:30

## 2020-12-23 NOTE — TELEPHONE ENCOUNTER
PT FELL OUT OF BED LAST NIGHT AND BROKE HIS HIP HE IS IN [facility] THEY WANT TO DO HIP REPLACEMENT SURG  AND HE WANTS TO HOLD OFF UNTIL HE SPEAKS TO YOU  HE WANTS TO KNOW THE NAME OF A SURG  THAT YOU REFER TO  PLEASE CALL HIM [phone]

## 2020-12-24 PROBLEM — Z01.818 PREOPERATIVE EXAMINATION: Status: RESOLVED | Noted: 2020-12-23 | Resolved: 2020-12-24

## 2020-12-24 LAB
ANION GAP SERPL CALCULATED.3IONS-SCNC: 5 MMOL/L (ref 4–13)
BASOPHILS # BLD MANUAL: 0 THOUSAND/UL (ref 0–0.1)
BASOPHILS NFR MAR MANUAL: 0 % (ref 0–1)
BUN SERPL-MCNC: 18 MG/DL (ref 5–25)
CALCIUM SERPL-MCNC: 8.8 MG/DL (ref 8.3–10.1)
CHLORIDE SERPL-SCNC: 109 MMOL/L (ref 100–108)
CO2 SERPL-SCNC: 26 MMOL/L (ref 21–32)
CREAT SERPL-MCNC: 0.94 MG/DL (ref 0.6–1.3)
EOSINOPHIL # BLD MANUAL: 0 THOUSAND/UL (ref 0–0.4)
EOSINOPHIL NFR BLD MANUAL: 0 % (ref 0–6)
ERYTHROCYTE [DISTWIDTH] IN BLOOD BY AUTOMATED COUNT: 14 % (ref 11.6–15.1)
GFR SERPL CREATININE-BSD FRML MDRD: 77 ML/MIN/1.73SQ M
GLUCOSE SERPL-MCNC: 117 MG/DL (ref 65–140)
HCT VFR BLD AUTO: 38.6 % (ref 36.5–49.3)
HGB BLD-MCNC: 12.6 G/DL (ref 12–17)
LYMPHOCYTES # BLD AUTO: 0.48 THOUSAND/UL (ref 0.6–4.47)
LYMPHOCYTES # BLD AUTO: 4 % (ref 14–44)
MCH RBC QN AUTO: 31.9 PG (ref 26.8–34.3)
MCHC RBC AUTO-ENTMCNC: 32.6 G/DL (ref 31.4–37.4)
MCV RBC AUTO: 98 FL (ref 82–98)
MONOCYTES # BLD AUTO: 0.24 THOUSAND/UL (ref 0–1.22)
MONOCYTES NFR BLD: 2 % (ref 4–12)
NEUTROPHILS # BLD MANUAL: 11.09 THOUSAND/UL (ref 1.85–7.62)
NEUTS SEG NFR BLD AUTO: 92 % (ref 43–75)
NRBC BLD AUTO-RTO: 0 /100 WBCS
PLATELET # BLD AUTO: 179 THOUSANDS/UL (ref 149–390)
PLATELET BLD QL SMEAR: ADEQUATE
PMV BLD AUTO: 9.5 FL (ref 8.9–12.7)
POTASSIUM SERPL-SCNC: 4.2 MMOL/L (ref 3.5–5.3)
RBC # BLD AUTO: 3.95 MILLION/UL (ref 3.88–5.62)
RBC MORPH BLD: NORMAL
SODIUM SERPL-SCNC: 140 MMOL/L (ref 136–145)
VARIANT LYMPHS # BLD AUTO: 2 %
WBC # BLD AUTO: 12.05 THOUSAND/UL (ref 4.31–10.16)

## 2020-12-24 PROCEDURE — 85027 COMPLETE CBC AUTOMATED: CPT | Performed by: PHYSICIAN ASSISTANT

## 2020-12-24 PROCEDURE — 99232 SBSQ HOSP IP/OBS MODERATE 35: CPT | Performed by: INTERNAL MEDICINE

## 2020-12-24 PROCEDURE — 85007 BL SMEAR W/DIFF WBC COUNT: CPT | Performed by: PHYSICIAN ASSISTANT

## 2020-12-24 PROCEDURE — 97167 OT EVAL HIGH COMPLEX 60 MIN: CPT

## 2020-12-24 PROCEDURE — 97163 PT EVAL HIGH COMPLEX 45 MIN: CPT

## 2020-12-24 PROCEDURE — NC001 PR NO CHARGE: Performed by: ORTHOPAEDIC SURGERY

## 2020-12-24 PROCEDURE — 80048 BASIC METABOLIC PNL TOTAL CA: CPT | Performed by: PHYSICIAN ASSISTANT

## 2020-12-24 RX ADMIN — DOCUSATE SODIUM 100 MG: 100 CAPSULE ORAL at 17:45

## 2020-12-24 RX ADMIN — BUPROPION HYDROCHLORIDE 300 MG: 150 TABLET, EXTENDED RELEASE ORAL at 08:03

## 2020-12-24 RX ADMIN — Medication 60 MG: at 13:19

## 2020-12-24 RX ADMIN — ZINC SULFATE 220 MG (50 MG) CAPSULE 220 MG: CAPSULE at 08:03

## 2020-12-24 RX ADMIN — ROPINIROLE HYDROCHLORIDE 1 MG: 1 TABLET, FILM COATED ORAL at 15:24

## 2020-12-24 RX ADMIN — Medication 1000 UNITS: at 08:04

## 2020-12-24 RX ADMIN — OXYCODONE HYDROCHLORIDE 2.5 MG: 5 TABLET ORAL at 05:27

## 2020-12-24 RX ADMIN — DOCUSATE SODIUM 100 MG: 100 CAPSULE, LIQUID FILLED ORAL at 13:20

## 2020-12-24 RX ADMIN — B-COMPLEX W/ C & FOLIC ACID TAB 1 TABLET: TAB at 08:03

## 2020-12-24 RX ADMIN — ROPINIROLE HYDROCHLORIDE 1 MG: 1 TABLET, FILM COATED ORAL at 21:52

## 2020-12-24 RX ADMIN — ROPINIROLE HYDROCHLORIDE 1 MG: 1 TABLET, FILM COATED ORAL at 08:03

## 2020-12-24 RX ADMIN — SODIUM CHLORIDE, SODIUM GLUCONATE, SODIUM ACETATE, POTASSIUM CHLORIDE, MAGNESIUM CHLORIDE, SODIUM PHOSPHATE, DIBASIC, AND POTASSIUM PHOSPHATE 75 ML/HR: .53; .5; .37; .037; .03; .012; .00082 INJECTION, SOLUTION INTRAVENOUS at 05:39

## 2020-12-24 RX ADMIN — PRAVASTATIN SODIUM 80 MG: 80 TABLET ORAL at 17:44

## 2020-12-24 RX ADMIN — ACETAMINOPHEN 975 MG: 325 TABLET, FILM COATED ORAL at 15:30

## 2020-12-24 RX ADMIN — ACETAMINOPHEN 975 MG: 325 TABLET, FILM COATED ORAL at 05:22

## 2020-12-24 RX ADMIN — PANTOPRAZOLE SODIUM 40 MG: 40 TABLET, DELAYED RELEASE ORAL at 05:23

## 2020-12-24 RX ADMIN — ENOXAPARIN SODIUM 40 MG: 40 INJECTION SUBCUTANEOUS at 03:30

## 2020-12-24 RX ADMIN — CYANOCOBALAMIN TAB 500 MCG 1000 MCG: 500 TAB at 08:03

## 2020-12-24 RX ADMIN — ACETAMINOPHEN 975 MG: 325 TABLET, FILM COATED ORAL at 21:52

## 2020-12-24 RX ADMIN — Medication 500 MG: at 08:03

## 2020-12-24 RX ADMIN — MAGNESIUM OXIDE TAB 400 MG (241.3 MG ELEMENTAL MG) 400 MG: 400 (241.3 MG) TAB at 08:04

## 2020-12-24 RX ADMIN — GABAPENTIN 100 MG: 100 CAPSULE ORAL at 21:52

## 2020-12-25 PROBLEM — R50.9 FEVER: Status: ACTIVE | Noted: 2020-12-25

## 2020-12-25 LAB
ANION GAP SERPL CALCULATED.3IONS-SCNC: 7 MMOL/L (ref 4–13)
BUN SERPL-MCNC: 19 MG/DL (ref 5–25)
CALCIUM SERPL-MCNC: 9.2 MG/DL (ref 8.3–10.1)
CHLORIDE SERPL-SCNC: 105 MMOL/L (ref 100–108)
CO2 SERPL-SCNC: 26 MMOL/L (ref 21–32)
CREAT SERPL-MCNC: 1.02 MG/DL (ref 0.6–1.3)
ERYTHROCYTE [DISTWIDTH] IN BLOOD BY AUTOMATED COUNT: 14.4 % (ref 11.6–15.1)
GFR SERPL CREATININE-BSD FRML MDRD: 70 ML/MIN/1.73SQ M
GLUCOSE SERPL-MCNC: 100 MG/DL (ref 65–140)
HCT VFR BLD AUTO: 40.4 % (ref 36.5–49.3)
HGB BLD-MCNC: 13.2 G/DL (ref 12–17)
MCH RBC QN AUTO: 31.9 PG (ref 26.8–34.3)
MCHC RBC AUTO-ENTMCNC: 32.7 G/DL (ref 31.4–37.4)
MCV RBC AUTO: 98 FL (ref 82–98)
PLATELET # BLD AUTO: 180 THOUSANDS/UL (ref 149–390)
PMV BLD AUTO: 9.3 FL (ref 8.9–12.7)
POTASSIUM SERPL-SCNC: 4.3 MMOL/L (ref 3.5–5.3)
RBC # BLD AUTO: 4.14 MILLION/UL (ref 3.88–5.62)
SODIUM SERPL-SCNC: 138 MMOL/L (ref 136–145)
WBC # BLD AUTO: 9.82 THOUSAND/UL (ref 4.31–10.16)

## 2020-12-25 PROCEDURE — 80048 BASIC METABOLIC PNL TOTAL CA: CPT | Performed by: PHYSICIAN ASSISTANT

## 2020-12-25 PROCEDURE — 85027 COMPLETE CBC AUTOMATED: CPT | Performed by: PHYSICIAN ASSISTANT

## 2020-12-25 PROCEDURE — 97530 THERAPEUTIC ACTIVITIES: CPT

## 2020-12-25 PROCEDURE — 97112 NEUROMUSCULAR REEDUCATION: CPT

## 2020-12-25 PROCEDURE — 97116 GAIT TRAINING THERAPY: CPT

## 2020-12-25 PROCEDURE — NC001 PR NO CHARGE: Performed by: ORTHOPAEDIC SURGERY

## 2020-12-25 PROCEDURE — 99232 SBSQ HOSP IP/OBS MODERATE 35: CPT | Performed by: INTERNAL MEDICINE

## 2020-12-25 RX ADMIN — ACETAMINOPHEN 975 MG: 325 TABLET, FILM COATED ORAL at 14:01

## 2020-12-25 RX ADMIN — ROPINIROLE HYDROCHLORIDE 1 MG: 1 TABLET, FILM COATED ORAL at 21:31

## 2020-12-25 RX ADMIN — MAGNESIUM OXIDE TAB 400 MG (241.3 MG ELEMENTAL MG) 400 MG: 400 (241.3 MG) TAB at 08:20

## 2020-12-25 RX ADMIN — ACETAMINOPHEN 975 MG: 325 TABLET, FILM COATED ORAL at 06:07

## 2020-12-25 RX ADMIN — Medication 60 MG: at 08:21

## 2020-12-25 RX ADMIN — CYANOCOBALAMIN TAB 500 MCG 1000 MCG: 500 TAB at 08:20

## 2020-12-25 RX ADMIN — Medication 500 MG: at 08:21

## 2020-12-25 RX ADMIN — Medication 1000 UNITS: at 08:20

## 2020-12-25 RX ADMIN — DOCUSATE SODIUM 100 MG: 100 CAPSULE ORAL at 16:47

## 2020-12-25 RX ADMIN — BUPROPION HYDROCHLORIDE 300 MG: 150 TABLET, EXTENDED RELEASE ORAL at 08:20

## 2020-12-25 RX ADMIN — B-COMPLEX W/ C & FOLIC ACID TAB 1 TABLET: TAB at 08:20

## 2020-12-25 RX ADMIN — ACETAMINOPHEN 975 MG: 325 TABLET, FILM COATED ORAL at 21:31

## 2020-12-25 RX ADMIN — ENOXAPARIN SODIUM 40 MG: 40 INJECTION SUBCUTANEOUS at 03:12

## 2020-12-25 RX ADMIN — GABAPENTIN 100 MG: 100 CAPSULE ORAL at 21:31

## 2020-12-25 RX ADMIN — PANTOPRAZOLE SODIUM 40 MG: 40 TABLET, DELAYED RELEASE ORAL at 06:07

## 2020-12-25 RX ADMIN — ZINC SULFATE 220 MG (50 MG) CAPSULE 220 MG: CAPSULE at 08:20

## 2020-12-25 RX ADMIN — SENNOSIDES 8.6 MG: 8.6 TABLET, FILM COATED ORAL at 08:20

## 2020-12-25 RX ADMIN — PRAVASTATIN SODIUM 80 MG: 80 TABLET ORAL at 16:47

## 2020-12-25 RX ADMIN — ROPINIROLE HYDROCHLORIDE 1 MG: 1 TABLET, FILM COATED ORAL at 08:20

## 2020-12-25 RX ADMIN — CLONAZEPAM 0.5 MG: 0.5 TABLET ORAL at 08:20

## 2020-12-25 RX ADMIN — ROPINIROLE HYDROCHLORIDE 1 MG: 1 TABLET, FILM COATED ORAL at 16:47

## 2020-12-25 RX ADMIN — DOCUSATE SODIUM 100 MG: 100 CAPSULE ORAL at 08:20

## 2020-12-26 ENCOUNTER — APPOINTMENT (INPATIENT)
Dept: RADIOLOGY | Facility: HOSPITAL | Age: 80
DRG: 470 | End: 2020-12-26
Payer: MEDICARE

## 2020-12-26 LAB
ANION GAP SERPL CALCULATED.3IONS-SCNC: 3 MMOL/L (ref 4–13)
BUN SERPL-MCNC: 18 MG/DL (ref 5–25)
CALCIUM SERPL-MCNC: 8.8 MG/DL (ref 8.3–10.1)
CHLORIDE SERPL-SCNC: 108 MMOL/L (ref 100–108)
CO2 SERPL-SCNC: 27 MMOL/L (ref 21–32)
CREAT SERPL-MCNC: 1.01 MG/DL (ref 0.6–1.3)
ERYTHROCYTE [DISTWIDTH] IN BLOOD BY AUTOMATED COUNT: 14.4 % (ref 11.6–15.1)
GFR SERPL CREATININE-BSD FRML MDRD: 70 ML/MIN/1.73SQ M
GLUCOSE SERPL-MCNC: 118 MG/DL (ref 65–140)
HCT VFR BLD AUTO: 39.5 % (ref 36.5–49.3)
HGB BLD-MCNC: 12.9 G/DL (ref 12–17)
MCH RBC QN AUTO: 32.2 PG (ref 26.8–34.3)
MCHC RBC AUTO-ENTMCNC: 32.7 G/DL (ref 31.4–37.4)
MCV RBC AUTO: 99 FL (ref 82–98)
PLATELET # BLD AUTO: 176 THOUSANDS/UL (ref 149–390)
PMV BLD AUTO: 10 FL (ref 8.9–12.7)
POTASSIUM SERPL-SCNC: 4.4 MMOL/L (ref 3.5–5.3)
RBC # BLD AUTO: 4.01 MILLION/UL (ref 3.88–5.62)
SODIUM SERPL-SCNC: 138 MMOL/L (ref 136–145)
WBC # BLD AUTO: 8.04 THOUSAND/UL (ref 4.31–10.16)

## 2020-12-26 PROCEDURE — 99024 POSTOP FOLLOW-UP VISIT: CPT | Performed by: PHYSICIAN ASSISTANT

## 2020-12-26 PROCEDURE — 85027 COMPLETE CBC AUTOMATED: CPT | Performed by: PHYSICIAN ASSISTANT

## 2020-12-26 PROCEDURE — 71046 X-RAY EXAM CHEST 2 VIEWS: CPT

## 2020-12-26 PROCEDURE — 80048 BASIC METABOLIC PNL TOTAL CA: CPT | Performed by: PHYSICIAN ASSISTANT

## 2020-12-26 PROCEDURE — 99232 SBSQ HOSP IP/OBS MODERATE 35: CPT | Performed by: INTERNAL MEDICINE

## 2020-12-26 RX ADMIN — ROPINIROLE HYDROCHLORIDE 1 MG: 1 TABLET, FILM COATED ORAL at 21:20

## 2020-12-26 RX ADMIN — CYANOCOBALAMIN TAB 500 MCG 1000 MCG: 500 TAB at 08:18

## 2020-12-26 RX ADMIN — ACETAMINOPHEN 975 MG: 325 TABLET, FILM COATED ORAL at 21:20

## 2020-12-26 RX ADMIN — BUPROPION HYDROCHLORIDE 300 MG: 150 TABLET, EXTENDED RELEASE ORAL at 08:18

## 2020-12-26 RX ADMIN — Medication 60 MG: at 08:19

## 2020-12-26 RX ADMIN — GABAPENTIN 100 MG: 100 CAPSULE ORAL at 21:20

## 2020-12-26 RX ADMIN — ROPINIROLE HYDROCHLORIDE 1 MG: 1 TABLET, FILM COATED ORAL at 17:04

## 2020-12-26 RX ADMIN — DOCUSATE SODIUM 100 MG: 100 CAPSULE ORAL at 17:04

## 2020-12-26 RX ADMIN — ZINC SULFATE 220 MG (50 MG) CAPSULE 220 MG: CAPSULE at 08:17

## 2020-12-26 RX ADMIN — CLONAZEPAM 0.5 MG: 0.5 TABLET ORAL at 08:17

## 2020-12-26 RX ADMIN — Medication 500 MG: at 08:19

## 2020-12-26 RX ADMIN — PANTOPRAZOLE SODIUM 40 MG: 40 TABLET, DELAYED RELEASE ORAL at 05:52

## 2020-12-26 RX ADMIN — Medication 1000 UNITS: at 08:17

## 2020-12-26 RX ADMIN — ROPINIROLE HYDROCHLORIDE 1 MG: 1 TABLET, FILM COATED ORAL at 08:17

## 2020-12-26 RX ADMIN — MAGNESIUM OXIDE TAB 400 MG (241.3 MG ELEMENTAL MG) 400 MG: 400 (241.3 MG) TAB at 08:18

## 2020-12-26 RX ADMIN — B-COMPLEX W/ C & FOLIC ACID TAB 1 TABLET: TAB at 08:17

## 2020-12-26 RX ADMIN — DOCUSATE SODIUM 100 MG: 100 CAPSULE ORAL at 08:17

## 2020-12-26 RX ADMIN — ENOXAPARIN SODIUM 40 MG: 40 INJECTION SUBCUTANEOUS at 08:18

## 2020-12-26 RX ADMIN — DOCUSATE SODIUM 100 MG: 100 CAPSULE, LIQUID FILLED ORAL at 05:59

## 2020-12-26 RX ADMIN — SENNOSIDES 8.6 MG: 8.6 TABLET, FILM COATED ORAL at 08:17

## 2020-12-26 RX ADMIN — PRAVASTATIN SODIUM 80 MG: 80 TABLET ORAL at 17:04

## 2020-12-26 RX ADMIN — ACETAMINOPHEN 975 MG: 325 TABLET, FILM COATED ORAL at 05:52

## 2020-12-27 VITALS
OXYGEN SATURATION: 95 % | RESPIRATION RATE: 20 BRPM | BODY MASS INDEX: 28.24 KG/M2 | DIASTOLIC BLOOD PRESSURE: 70 MMHG | TEMPERATURE: 98.9 F | SYSTOLIC BLOOD PRESSURE: 141 MMHG | HEART RATE: 85 BPM | WEIGHT: 179.9 LBS | HEIGHT: 67 IN

## 2020-12-27 PROCEDURE — 99239 HOSP IP/OBS DSCHRG MGMT >30: CPT | Performed by: INTERNAL MEDICINE

## 2020-12-27 RX ADMIN — Medication 60 MG: at 10:40

## 2020-12-27 RX ADMIN — ACETAMINOPHEN 975 MG: 325 TABLET, FILM COATED ORAL at 06:23

## 2020-12-27 RX ADMIN — ENOXAPARIN SODIUM 40 MG: 40 INJECTION SUBCUTANEOUS at 10:39

## 2020-12-27 RX ADMIN — MAGNESIUM OXIDE TAB 400 MG (241.3 MG ELEMENTAL MG) 400 MG: 400 (241.3 MG) TAB at 10:39

## 2020-12-27 RX ADMIN — CLONAZEPAM 0.5 MG: 0.5 TABLET ORAL at 10:38

## 2020-12-27 RX ADMIN — PANTOPRAZOLE SODIUM 40 MG: 40 TABLET, DELAYED RELEASE ORAL at 06:23

## 2020-12-27 RX ADMIN — SENNOSIDES 8.6 MG: 8.6 TABLET, FILM COATED ORAL at 10:40

## 2020-12-27 RX ADMIN — Medication 1000 UNITS: at 10:38

## 2020-12-27 RX ADMIN — ZINC SULFATE 220 MG (50 MG) CAPSULE 220 MG: CAPSULE at 10:39

## 2020-12-27 RX ADMIN — DOCUSATE SODIUM 100 MG: 100 CAPSULE ORAL at 10:39

## 2020-12-27 RX ADMIN — CYANOCOBALAMIN TAB 500 MCG 1000 MCG: 500 TAB at 10:39

## 2020-12-27 RX ADMIN — BUPROPION HYDROCHLORIDE 300 MG: 150 TABLET, EXTENDED RELEASE ORAL at 10:38

## 2020-12-27 RX ADMIN — B-COMPLEX W/ C & FOLIC ACID TAB 1 TABLET: TAB at 10:39

## 2020-12-27 RX ADMIN — Medication 500 MG: at 10:40

## 2020-12-27 RX ADMIN — ROPINIROLE HYDROCHLORIDE 1 MG: 1 TABLET, FILM COATED ORAL at 10:38

## 2020-12-28 ENCOUNTER — TELEPHONE (OUTPATIENT)
Dept: OBGYN CLINIC | Facility: HOSPITAL | Age: 80
End: 2020-12-28

## 2020-12-29 ENCOUNTER — TRANSITIONAL CARE MANAGEMENT (OUTPATIENT)
Dept: FAMILY MEDICINE CLINIC | Facility: CLINIC | Age: 80
End: 2020-12-29

## 2021-01-04 ENCOUNTER — OFFICE VISIT (OUTPATIENT)
Dept: FAMILY MEDICINE CLINIC | Facility: CLINIC | Age: 81
End: 2021-01-04
Payer: MEDICARE

## 2021-01-04 VITALS
BODY MASS INDEX: 27.75 KG/M2 | HEART RATE: 67 BPM | HEIGHT: 67 IN | DIASTOLIC BLOOD PRESSURE: 86 MMHG | SYSTOLIC BLOOD PRESSURE: 150 MMHG | RESPIRATION RATE: 12 BRPM | OXYGEN SATURATION: 97 % | WEIGHT: 176.8 LBS

## 2021-01-04 DIAGNOSIS — Z09 HOSPITAL DISCHARGE FOLLOW-UP: Primary | ICD-10-CM

## 2021-01-04 DIAGNOSIS — R03.0 ELEVATED BLOOD PRESSURE READING: ICD-10-CM

## 2021-01-04 PROCEDURE — 99495 TRANSJ CARE MGMT MOD F2F 14D: CPT | Performed by: INTERNAL MEDICINE

## 2021-01-04 NOTE — PROGRESS NOTES
Assessment/Plan:    No problem-specific Assessment & Plan notes found for this encounter  Diagnoses and all orders for this visit:    Hospital discharge follow-up    Elevated blood pressure reading  -     Blood Pressure Monitoring KIT; Use 2 (two) times a day Omron, upper arm, regular size          Patient doing okay after left total hip arthroplasty  He follows up with orthopedics  He is currently on Lovenox for DVT prophylaxis  BMP in cell count was within normal limits before discharge  His blood pressure reading was slightly elevated today, will give him script for blood pressure monitor and he will check his blood pressures at home twice a day and he will report me the numbers  Subjective:      Patient ID: Shaggy Dudley is a 79 y o  male  Patient came today for TCM visit after his hospital stay because of the left he fracture, he underwent left total hip arthroplasty on 12 03/20/2020  He follows up with orthopedics  He has appointment scheduled for tomorrow  He reports that he has 3/10 intensity pain there that is well controlled with Tylenol  Denies any chills or fevers, no chest pain, palpitation, abdominal pain or urinary symptoms  He reports that his left lower extremity slightly swollen but he is currently on Lovenox for DVT prophylaxis  The following portions of the patient's history were reviewed and updated as appropriate: allergies, current medications, past family history, past medical history, past social history, past surgical history, and problem list     Review of Systems   Constitutional: Negative for chills, fatigue and fever  Respiratory: Negative for cough, chest tightness and shortness of breath  Cardiovascular: Negative for chest pain, palpitations and leg swelling  Gastrointestinal: Negative for abdominal distention, abdominal pain, blood in stool, constipation, diarrhea and nausea  Genitourinary: Negative for difficulty urinating and dysuria  Musculoskeletal: Positive for arthralgias (Left hip)  Neurological: Negative for dizziness, weakness, numbness and headaches  Psychiatric/Behavioral: Negative for agitation  Objective:      /86 (BP Location: Left arm, Patient Position: Sitting, Cuff Size: Standard)   Pulse 67   Resp 12   Ht 5' 7" (1 702 m)   Wt 80 2 kg (176 lb 12 8 oz)   SpO2 97%   BMI 27 69 kg/m²          Physical Exam          Current Outpatient Medications:     acetaminophen (TYLENOL) 650 mg CR tablet, Take 1 tablet (650 mg total) by mouth every 8 (eight) hours as needed for mild pain, Disp: 30 tablet, Rfl: 0    b complex vitamins tablet, Be 5/B6 100/500, Disp: , Rfl:     buPROPion (WELLBUTRIN XL) 300 mg 24 hr tablet, , Disp: , Rfl:     cholecalciferol (VITAMIN D3) 1,000 units tablet, Take 1 tablet (1,000 Units total) by mouth daily, Disp:  , Rfl:     Chromium 500 MCG TABS, One tablet daily, Disp:  , Rfl: 0    clonazePAM (KlonoPIN) 0 5 mg tablet, Take 0 5 mg by mouth daily at bedtime as needed for anxiety , Disp: , Rfl:     co-enzyme Q-10 50 MG capsule, Take 1 capsule (50 mg total) by mouth daily, Disp:  , Rfl:     Cyanocobalamin (B-12) 1000 MCG TABS, One tab daily, Disp:  , Rfl:     enoxaparin (LOVENOX) 40 mg/0 4 mL, 1 subcutaneous injection daily x 28 days AFTER surgery, Disp: 28 Syringe, Rfl: 0    gabapentin (NEURONTIN) 100 mg capsule, Take 1 tablet at bedtime  Titrate up to 3 tablets nightly as needed  , Disp: 90 capsule, Rfl: 3    Magnesium 300 MG CAPS, One tab daily, Disp:  , Rfl: 0    niacin 500 mg tablet, Take 1 tablet (500 mg total) by mouth daily with breakfast, Disp:  , Rfl:     Omega-3 Fatty Acids (Fish Oil) 1200 MG CPDR, bid, Disp:  , Rfl:     pantoprazole (PROTONIX) 40 mg tablet, Take 40 mg by mouth daily, Disp: , Rfl:     Potassium 99 MG TABS, One tab daily, Disp: 330 each, Rfl: 0    rOPINIRole (REQUIP XL) 2 MG 24 hr tablet, Take 2 mg by mouth daily, Disp: , Rfl:     rosuvastatin (CRESTOR) 10 MG tablet, Take 1 tablet (10 mg total) by mouth daily, Disp: 90 tablet, Rfl: 3    tadalafil (CIALIS) 5 MG tablet, Take 5 mg by mouth daily As directed, Disp: , Rfl:     Zinc 50 MG CAPS, Zinc/copper supplement 1 tab daily  , Disp:  , Rfl: 0    Blood Pressure Monitoring KIT, Use 2 (two) times a day Omron, upper arm, regular size, Disp: 1 kit, Rfl: 0       TCM Call (since 12/4/2020)     Date and time call was made  12/29/2020  1:26 PM    Hospital care reviewed  Records reviewed    Patient was hospitialized at  [facility]        Date of Admission  12/23/20    Date of discharge  12/27/20    Diagnosis  closed fracture of neck of left femur    Disposition  Home    Were the patients medications reviewed and updated  Yes      TCM Call (since 12/4/2020)     Scheduled for follow up?   Yes    Did you obtain your prescribed medications  Yes    Do you need help managing your prescriptions or medications  No    Is transportation to your appointment needed  No    I have advised the patient to call PCP with any new or worsening symptoms  Kaye Ja RMA     Living Arrangements  Spouse or Significiant other    Support System  Spouse    The type of support provided  Emotional; Financial; Physical    Do you have social support  Yes, as much as I need

## 2021-01-05 ENCOUNTER — OFFICE VISIT (OUTPATIENT)
Dept: OBGYN CLINIC | Facility: HOSPITAL | Age: 81
End: 2021-01-05

## 2021-01-05 ENCOUNTER — HOSPITAL ENCOUNTER (OUTPATIENT)
Dept: RADIOLOGY | Facility: HOSPITAL | Age: 81
Discharge: HOME | End: 2021-01-05
Attending: ORTHOPAEDIC SURGERY
Payer: MEDICARE

## 2021-01-05 VITALS
SYSTOLIC BLOOD PRESSURE: 131 MMHG | BODY MASS INDEX: 27.62 KG/M2 | HEIGHT: 67 IN | DIASTOLIC BLOOD PRESSURE: 73 MMHG | WEIGHT: 176 LBS | HEART RATE: 69 BPM

## 2021-01-05 DIAGNOSIS — Z47.1 AFTERCARE FOLLOWING LEFT HIP JOINT REPLACEMENT SURGERY: Primary | ICD-10-CM

## 2021-01-05 DIAGNOSIS — Z47.1 AFTERCARE FOLLOWING LEFT HIP JOINT REPLACEMENT SURGERY: ICD-10-CM

## 2021-01-05 DIAGNOSIS — Z96.642 AFTERCARE FOLLOWING LEFT HIP JOINT REPLACEMENT SURGERY: Primary | ICD-10-CM

## 2021-01-05 DIAGNOSIS — R26.81 GAIT INSTABILITY: ICD-10-CM

## 2021-01-05 DIAGNOSIS — Z96.642 AFTERCARE FOLLOWING LEFT HIP JOINT REPLACEMENT SURGERY: ICD-10-CM

## 2021-01-05 PROCEDURE — 73502 X-RAY EXAM HIP UNI 2-3 VIEWS: CPT

## 2021-01-05 PROCEDURE — 99024 POSTOP FOLLOW-UP VISIT: CPT | Performed by: ORTHOPAEDIC SURGERY

## 2021-01-05 RX ORDER — AMOXICILLIN 500 MG/1
CAPSULE ORAL
Qty: 4 CAPSULE | Refills: 2 | Status: SHIPPED | OUTPATIENT
Start: 2021-01-05 | End: 2021-02-09 | Stop reason: SDUPTHER

## 2021-01-05 NOTE — PATIENT INSTRUCTIONS
Plan:    * The xrays of the left hip was review with the patient and his wife  * He can shower and pad dry, the residual glue will come out  * Referral for physical therapy to start strengthening the quadriceps, gait training and balance  * Emphasized anterior hip precaution of getting the hip in a extended position  * He will get back to his normal activities around 3 months and progress to 18 months  * As long as he something in his stomach, taking an IBU and tylenol can be taken together  * Complete another 14 days of Lovenox, leave the rest    * Reminder to wait at least 2 months after the surgery for a dental cleaning and procedure  Prior to any cleaning or procedures; take antibiotic 1 hour prior to the visit  * Amoxicillin 500m tablets 1 hour prior to dental cleaning and procedures  * Ordered a cane to be able to transition off the walker

## 2021-01-05 NOTE — PROGRESS NOTES
Assessment:   Diagnosis ICD-10-CM Associated Orders   1  Gait instability  R26 81 Cane   2  Aftercare following left hip joint replacement surgery  Z47 1 XR hip/pelv 2-3 vws left if performed    Z96 642 Ambulatory referral to Physical Therapy     Cane     amoxicillin (AMOXIL) 500 mg capsule       Plan:    * The xrays of the left hip was review with the patient and his wife  * He can shower and pad dry, the residual glue will come out  * Referral for physical therapy to start strengthening the quadriceps, gait training and balance  * Emphasized anterior hip precaution of getting the hip in a extended position  * He will get back to his normal activities around 3 months and progress to 18 months  * As long as he something in his stomach, taking an IBU and tylenol can be taken together  * Complete another 14 days of Lovenox, leave the rest    * Reminder to wait at least 2 months after the surgery for a dental cleaning and procedure  Prior to any cleaning or procedures; take antibiotic 1 hour prior to the visit  * Amoxicillin 500m tablets 1 hour prior to dental cleaning and procedures  * Ordered a cane to be able to transition off the walker  To do next visit:  Return in about 10 weeks (around 3/16/2021) for left hip with xrays  The above stated was discussed in layman's terms and the patient expressed understanding  All questions were answered to the patient's satisfaction  Scribe Attestation    I,:  Dontae Dumont am acting as a scribe while in the presence of the attending physician :       I,:  Florian Silva MD personally performed the services described in this documentation    as scribed in my presence :             Subjective:   Vasiliy Sidhu is a 79 y o  male who presents today for his 1st postop visit for his left hip  He is 13 days surgery post anterior left total hip, 2020  He is accompanied with his wife today  He ambulates with a walker today   He has no complaints  He has some residual numbness along the incision  He has some soreness when he tried to put his socks on  Review of systems negative unless otherwise specified in HPI  Review of Systems   Constitutional: Negative for chills, fever and unexpected weight change  HENT: Negative for hearing loss, nosebleeds and sore throat  Eyes: Negative for pain, redness and visual disturbance  Respiratory: Negative for cough, shortness of breath and wheezing  Cardiovascular: Negative for chest pain, palpitations and leg swelling  Gastrointestinal: Negative for abdominal pain, nausea and vomiting  Endocrine: Negative for polydipsia and polyuria  Genitourinary: Negative for dysuria and hematuria  Skin: Negative for rash and wound  Neurological: Negative for dizziness, light-headedness and headaches  Psychiatric/Behavioral: Negative for decreased concentration, dysphoric mood and suicidal ideas  The patient is not nervous/anxious          Past Medical History:   Diagnosis Date    Measles 1952    Pericarditis 1980       Past Surgical History:   Procedure Laterality Date    HIP ARTHROPLASTY Left 12/23/2020    Procedure: ANTERIOR APPROACH ARTHROPLASTY HIP TOTAL;  Surgeon: Annemarie Draper MD;  Location: [facility];  Service: Orthopedics    INGUINAL HERNIA REPAIR Bilateral     1995    KNEE SURGERY      knee arthroscopy    PROSTATE SURGERY  2002    Prostatectomy    SPINE SURGERY      Micro diskectomy November 2010 and lumbar laminectomy [facility] [place] [facility]       Family History   Problem Relation Age of Onset    Stroke Mother     Hypertension Mother     Prostate cancer Brother     Glaucoma Brother     No Known Problems Daughter     Stroke Maternal Grandmother     Stroke Maternal Aunt     Coronary artery disease Brother     No Known Problems Daughter        Social History     Occupational History    Not on file   Tobacco Use    Smoking status: Never Smoker    Smokeless tobacco: Never Used   Substance and Sexual Activity    Alcohol use: Not Currently     Comment: Recovering alcoholic  Quit drinking 1992   Drug use: Never    Sexual activity: Not Currently         Current Outpatient Medications:     acetaminophen (TYLENOL) 650 mg CR tablet, Take 1 tablet (650 mg total) by mouth every 8 (eight) hours as needed for mild pain, Disp: 30 tablet, Rfl: 0    b complex vitamins tablet, Be 5/B6 100/500, Disp: , Rfl:     Blood Pressure Monitoring KIT, Use 2 (two) times a day Omron, upper arm, regular size, Disp: 1 kit, Rfl: 0    buPROPion (WELLBUTRIN XL) 300 mg 24 hr tablet, , Disp: , Rfl:     cholecalciferol (VITAMIN D3) 1,000 units tablet, Take 1 tablet (1,000 Units total) by mouth daily, Disp:  , Rfl:     Chromium 500 MCG TABS, One tablet daily, Disp:  , Rfl: 0    clonazePAM (KlonoPIN) 0 5 mg tablet, Take 0 5 mg by mouth daily at bedtime as needed for anxiety , Disp: , Rfl:     co-enzyme Q-10 50 MG capsule, Take 1 capsule (50 mg total) by mouth daily, Disp:  , Rfl:     Cyanocobalamin (B-12) 1000 MCG TABS, One tab daily, Disp:  , Rfl:     enoxaparin (LOVENOX) 40 mg/0 4 mL, 1 subcutaneous injection daily x 28 days AFTER surgery, Disp: 28 Syringe, Rfl: 0    gabapentin (NEURONTIN) 100 mg capsule, Take 1 tablet at bedtime  Titrate up to 3 tablets nightly as needed  , Disp: 90 capsule, Rfl: 3    Magnesium 300 MG CAPS, One tab daily, Disp:  , Rfl: 0    niacin 500 mg tablet, Take 1 tablet (500 mg total) by mouth daily with breakfast, Disp:  , Rfl:     Omega-3 Fatty Acids (Fish Oil) 1200 MG CPDR, bid, Disp:  , Rfl:     pantoprazole (PROTONIX) 40 mg tablet, Take 40 mg by mouth daily, Disp: , Rfl:     Potassium 99 MG TABS, One tab daily, Disp: 330 each, Rfl: 0    rOPINIRole (REQUIP XL) 2 MG 24 hr tablet, Take 2 mg by mouth daily, Disp: , Rfl:     rosuvastatin (CRESTOR) 10 MG tablet, Take 1 tablet (10 mg total) by mouth daily, Disp: 90 tablet, Rfl: 3    tadalafil (CIALIS) 5 MG tablet, Take 5 mg by mouth daily As directed, Disp: , Rfl:     Zinc 50 MG CAPS, Zinc/copper supplement 1 tab daily  , Disp:  , Rfl: 0    Allergies   Allergen Reactions    Other      Hayfever, tree pollen  Vitals:    01/05/21 1326   BP: 131/73   Pulse: 69       Objective:                    Left Hip Exam     Tenderness   Left hip tenderness location: mild along the incision  Range of Motion   Flexion: 100   External rotation: normal   Internal rotation: normal     Muscle Strength   Abduction: 5/5   Adduction: 5/5   Flexion: 5/5     Other   Erythema: absent  Sensation: normal  Pulse: present    Comments:  Calf is soft and tender            Diagnostics, reviewed and taken today if performed as documented: The attending physician has personally reviewed the pertinent films in PACS and interpretation is as follows:  Xrays of the left hip:  Implants in place  No sign of loosening  Procedures, if performed today:    Procedures    None performed      Portions of the record may have been created with voice recognition software  Occasional wrong word or "sound a like" substitutions may have occurred due to the inherent limitations of voice recognition software  Read the chart carefully and recognize, using context, where substitutions have occurred

## 2021-01-07 ENCOUNTER — EVALUATION (OUTPATIENT)
Dept: PHYSICAL THERAPY | Facility: CLINIC | Age: 81
End: 2021-01-07
Payer: MEDICARE

## 2021-01-07 DIAGNOSIS — Z47.1 AFTERCARE FOLLOWING LEFT HIP JOINT REPLACEMENT SURGERY: ICD-10-CM

## 2021-01-07 DIAGNOSIS — Z96.642 AFTERCARE FOLLOWING LEFT HIP JOINT REPLACEMENT SURGERY: ICD-10-CM

## 2021-01-07 PROCEDURE — 97110 THERAPEUTIC EXERCISES: CPT | Performed by: PHYSICAL THERAPIST

## 2021-01-07 PROCEDURE — 97161 PT EVAL LOW COMPLEX 20 MIN: CPT | Performed by: PHYSICAL THERAPIST

## 2021-01-07 NOTE — PROGRESS NOTES
PT Evaluation     Today's date: 2021  Patient name: Eladia Hubbard  : 1940  MRN: [id number]  Referring provider: Meng Kraft MD  Dx:   Encounter Diagnosis     ICD-10-CM    1  Aftercare following left hip joint replacement surgery  Z47 1 Ambulatory referral to Physical Therapy    Z96 642                   Assessment  Assessment details: Eladia Hubbard is a 79 y o  male presenting status post L total hip arthroplasty following a traumatic fall off the bed  He presents with the following impairments including: impaired hip strength, limited ROM, balance deficits, abnormal gait, difficulty ambulating, and pain with function  Additionally, he has functional deficits including: difficulty putting on his socks, difficulty navigating steps, difficulty with prolonged ambulation, inability to sit for prolonged periods, and inability to walk dog without fear of falls  He demonstrates appropriate understanding of hip precautions with an anterior approach and imports of safe ambulation at home as part of initial rehabilitation process  Eladia Hubbard would likely benefit from skilled physical therapy to address impairments and functional deficits through a specific program targeting hip ROM exercises, strengthening exercises for hip abductors and extensors, static and dynamic balance training, and functional exercises to return to prior level of function  In the short term, patient would likely benefit from gait training to assist with improved gait function with single point cane and without as appropriate  Impairments: abnormal gait, abnormal or restricted ROM, abnormal movement, activity intolerance, impaired balance, impaired physical strength, lacks appropriate home exercise program, pain with function, safety issue, weight-bearing intolerance and poor body mechanics    Symptom irritability: lowUnderstanding of Dx/Px/POC: good   Prognosis: good    Goals  ST-2 Weeks:    1   Patient will be able to put socks on himself without need of assistance or multiple flexion attempts  2  Patient will return to 95 degrees of flexion ROM in order to assist with sitting tolerance for prolonged periods  3  Patient will be able to perform single leg stance for greater than 10 seconds in order to improve gait ability with and without single point cane  LT-6 Weeks:    1  Patient will achieve 4/5 hip abduction strength to return to typical reciprocal gait pattern without pain or antalgia  2  Patient will achieve greater than 30 seconds static balance on unstable surface to improve ability to walk to the bathroom at night  3   Patient will achieve TUG score of less than 12 seconds in order to decrease fall risk and improve dynamic balance for walking in the neighborhood  Plan  Patient would benefit from: PT eval and skilled physical therapy  Planned modality interventions: cryotherapy, TENS and ultrasound  Planned therapy interventions: abdominal trunk stabilization, ADL retraining, ADL training, motor coordination training, balance/weight bearing training, balance, neuromuscular re-education, strengthening, flexibility, therapeutic exercise, therapeutic activities, stretching, patient education, gait training, functional ROM exercises, graded activity, graded exercise, graded motor, home exercise program, coordination and body mechanics training        Subjective Evaluation    History of Present Illness  Date of surgery: 2020  Mechanism of injury: surgery  Mechanism of injury: Annita Hays is a 79 y o  male presenting today status post L anterior hip arthroplasty with an anterior approach  He injured the hip while playing with his dog on the bed, and rolling off, landing on the hip and fracturing it  In the 2 weeks since surgery he has been ambulating at home as often as he can, resting when his leg swells after activity  When he lies flat the edema dissipates     He has been ambulating with a single point cane for the last few days, and has not experienced any significant pain, just discomfort when sitting for prolonged periods  He is aware of the hip precautions with an anterior approach, and he has been avoiding hip and back extension  He has a history of lumbar fusion surgery and radiculopathy of the R leg  He reports no erythema, heat, or other signs of infection, and no signs of DVT at this time    Quality of life: good    Pain  Current pain ratin  At best pain ratin  At worst pain ratin  Quality: dull ache  Relieving factors: change in position and relaxation  Aggravating factors: walking and stair climbing  Progression: improved    Social Support  Steps to enter house: yes  Stairs in house: yes   Lives in: multiple-level home  Lives with: significant other    Employment status: not working  Treatments  Current treatment: physical therapy  Patient Goals  Patient goals for therapy: decreased edema, increased strength, independence with ADLs/IADLs, increased motion, decreased pain and improved balance  Patient goal: Put shoes and socks on himself with ease, ambulate with confidence without significant fall risk        Objective     Active Range of Motion   Left Hip   Flexion: 81 degrees   Abduction: 16 degrees   External rotation (90/90): 27 degrees   Internal rotation (90/90): 15 degrees     Right Hip   Flexion: 95 degrees   Abduction: 25 degrees   External rotation (90/90): 25 degrees   Internal rotation (90/90): 29 degrees     Strength/Myotome Testing     Left Hip   Planes of Motion   Flexion: 4-  Abduction: 2-  External rotation: 3  Internal rotation: 3    Right Hip   Planes of Motion   Flexion: 4+  Abduction: 2+  External rotation: 4  Internal rotation: 4  Neuro Exam:       Balance assessments   MCTSIB   Eyes open level surface: >30 seconds  Eyes open foam surface: <5 seconds  Eyes closed level surface: 15-30 seconds  Eyes closed foam surface: <5 seconds      Flowsheet Rows      Most Recent Value   PT/OT G-Codes   Current Score  48   Projected Score  0             Precautions:   Past Medical History:   Diagnosis Date    Measles 1952    Pericarditis 1980           Manuals 1/7            PROM                                                    Neuro Re-Ed             Tandem Balance             Romberg EO, EC             Tandem Walk             Walk w/ head turns                                                    Ther Ex             Bridges             SLR 2x10            Clams 2x10 OTB            LAQ             Mini Squats             Step Ups                                       Ther Activity                                       Gait Training             Amb w/ SPC                          Modalities

## 2021-01-12 ENCOUNTER — TELEPHONE (OUTPATIENT)
Dept: FAMILY MEDICINE CLINIC | Facility: CLINIC | Age: 81
End: 2021-01-12

## 2021-01-12 ENCOUNTER — OFFICE VISIT (OUTPATIENT)
Dept: PHYSICAL THERAPY | Facility: CLINIC | Age: 81
End: 2021-01-12
Payer: MEDICARE

## 2021-01-12 DIAGNOSIS — Z96.642 AFTERCARE FOLLOWING LEFT HIP JOINT REPLACEMENT SURGERY: Primary | ICD-10-CM

## 2021-01-12 DIAGNOSIS — Z47.1 AFTERCARE FOLLOWING LEFT HIP JOINT REPLACEMENT SURGERY: Primary | ICD-10-CM

## 2021-01-12 PROCEDURE — 97140 MANUAL THERAPY 1/> REGIONS: CPT | Performed by: PHYSICAL THERAPIST

## 2021-01-12 PROCEDURE — 97110 THERAPEUTIC EXERCISES: CPT | Performed by: PHYSICAL THERAPIST

## 2021-01-12 NOTE — TELEPHONE ENCOUNTER
Patient's send me his numbers of his blood pressure from home  They all with systolic around [phone]  It was discussed with the patient  Will hold off on any treatment

## 2021-01-12 NOTE — PROGRESS NOTES
Daily Note     Today's date: 2021  Patient name: Emeli Colmenares  : 1940  MRN: [id number]  Referring provider: Jagdish Hernandez MD  Dx:   Encounter Diagnosis     ICD-10-CM    1  Aftercare following left hip joint replacement surgery  Z47 1     Z96 [id number]        Start Time: 1430  Stop Time: 1515  Total time in clinic (min): 45 minutes    Subjective: Patient reports the swelling around his surgical site has decreased significantly over the past few days  He is able to ambulate without cane with near normal gait      Objective: See treatment diary below      Assessment: Tolerated treatment well  Patient demonstrated improved gait ability with reciprocal pattern without use of AD  Significantly challenged by sit to stand with emphasis on eccentric sit, but hip and knee extension steadily improving  Patient demonstrated fatigue post treatment, exhibited good technique with therapeutic exercises and would benefit from continued PT      Plan: Continue per plan of care  Progress treatment as tolerated  Progress resistance as appropriate with irritability particularly in closed chain with quadriceps strengthening       Precautions:   Past Medical History:   Diagnosis Date    Measles 1952    Pericarditis            Manuals            PROM  BR w/ hamstring                                                  Neuro Re-Ed             Tandem Balance  [id number]           Romberg EO, EC             Tandem Walk             Walk w/ head turns             S/L stand  2x30 sec                                     Ther Ex             Bridges  1/2 bridge with ball squeeze           SLR 2x10 2x10           Clams 2x10 OTB 2x10 GTB           LAQ  5# 2x10           Mini Squats  2x10           Step Ups  6" step 3x10           Sit to stand   Eccentric focus 1x10           Bike  4min           Ther Activity                                       Gait Training             Amb w/ SPC                          Modalities

## 2021-01-14 ENCOUNTER — OFFICE VISIT (OUTPATIENT)
Dept: PHYSICAL THERAPY | Facility: CLINIC | Age: 81
End: 2021-01-14
Payer: MEDICARE

## 2021-01-14 DIAGNOSIS — Z96.642 AFTERCARE FOLLOWING LEFT HIP JOINT REPLACEMENT SURGERY: Primary | ICD-10-CM

## 2021-01-14 DIAGNOSIS — Z47.1 AFTERCARE FOLLOWING LEFT HIP JOINT REPLACEMENT SURGERY: Primary | ICD-10-CM

## 2021-01-14 PROCEDURE — 97140 MANUAL THERAPY 1/> REGIONS: CPT | Performed by: PHYSICAL THERAPIST

## 2021-01-14 PROCEDURE — 97110 THERAPEUTIC EXERCISES: CPT | Performed by: PHYSICAL THERAPIST

## 2021-01-15 NOTE — PROGRESS NOTES
Daily Note     Today's date: 2021  Patient name: Lola Madison  : 1940  MRN: [id number]  Referring provider: Chandler Robison MD  Dx:   Encounter Diagnosis     ICD-10-CM    1  Aftercare following left hip joint replacement surgery  Z47 1     Z96 [id number]        Start Time: 98  Stop Time: 1615  Total time in clinic (min): 45 minutes    Subjective: Patient reports he has been a little more sore over the last few days, especially when getting up from a chair  Patient reports he feels a little more steady on her feet  Objective: See treatment diary below      Assessment: Tolerated treatment well  Patient continues to be challenged by eccentric sit to stand, hip abduction strength improving  Patient demonstrated fatigue post treatment, exhibited good technique with therapeutic exercises and would benefit from continued PT  Plan: Continue per plan of care  Progress treatment as tolerated  Progress resistance with hip abduction strength       Precautions:   Past Medical History:   Diagnosis Date    Measles     Pericarditis 1980           Manuals           PROM  BR w/ hamstring BR w/ hamstring                                                 Neuro Re-Ed             Tandem Balance  [id number] 2x30 sec          Romberg EO, EC             Tandem Walk             Walk w/ head turns             S/L stand  2x30 sec 2x30 sec                                    Ther Ex             Bridges  1/2 bridge with ball squeeze 1/2 bridge w/ ball squeeze          SLR 2x10 2x10 2x10          Clams 2x10 OTB 2x10 GTB           LAQ  5# 2x10 7 5# 2x10          Lateral Walk   PTB 3x                       Mini Squats  2x10 2x10          Step Ups  6" step 3x10 6" step 3x10          Sit to stand   Eccentric focus 1x10 Eccentric 2x10          Bike  4min 5 min          Ther Activity                                       Gait Training             Amb w/ SPC                          Modalities

## 2021-01-19 ENCOUNTER — OFFICE VISIT (OUTPATIENT)
Dept: PHYSICAL THERAPY | Facility: CLINIC | Age: 81
End: 2021-01-19
Payer: MEDICARE

## 2021-01-19 DIAGNOSIS — Z96.642 AFTERCARE FOLLOWING LEFT HIP JOINT REPLACEMENT SURGERY: Primary | ICD-10-CM

## 2021-01-19 DIAGNOSIS — Z47.1 AFTERCARE FOLLOWING LEFT HIP JOINT REPLACEMENT SURGERY: Primary | ICD-10-CM

## 2021-01-19 PROCEDURE — 97140 MANUAL THERAPY 1/> REGIONS: CPT | Performed by: PHYSICAL THERAPIST

## 2021-01-19 PROCEDURE — 97110 THERAPEUTIC EXERCISES: CPT | Performed by: PHYSICAL THERAPIST

## 2021-01-19 NOTE — PROGRESS NOTES
Daily Note     Today's date: 2021  Patient name: Emeli Colmenares  : 1940  MRN: [id number]  Referring provider: Jagdish Hernandez MD  Dx:   Encounter Diagnosis     ICD-10-CM    1  Aftercare following left hip joint replacement surgery  Z47 1     Z96 [id number]        Start Time: 1430  Stop Time: 1515  Total time in clinic (min): 45 minutes    Subjective: Patient reports he has some mild anterior hip pain 1-2/10, but he feels unchanged since last visit  Objective: See treatment diary below      Assessment: Tolerated treatment well  Patient challenged by progression of repetitions with functional activities  Single leg balance tremendously improved with L LE  Knee and hip extension strength steadily progressing  Patient demonstrated fatigue post treatment, exhibited good technique with therapeutic exercises and would benefit from continued PT      Plan: Continue per plan of care  Progress treatment as tolerated  Progress resistance with hip abduction strengthening and dynamic balance activities       Precautions:   Past Medical History:   Diagnosis Date    Measles     Pericarditis 1980           Manuals          PROM  BR w/ hamstring BR w/ hamstring BR w/ hamstring                                                Neuro Re-Ed             Tandem Balance  [id number] 2x30 sec Walk x5 w/ CG         Romberg EO, EC             Tandem Walk             Walk w/ head turns             S/L stand  2x30 sec 2x30 sec 10x10 airex         Weight Dist Squat    [id number]                      Ther Ex             Bridges  1/2 bridge with ball squeeze 1/2 bridge w/ ball squeeze 1/2 bridge w/ ball         SLR 2x10 2x10 2x10 2x10 3#         Clams 2x10 OTB 2x10 GTB           LAQ  5# 2x10 7 5# 2x10          Lateral Walk   PTB 3x PTB 3x                      Mini Squats  2x10 2x10 3x10         Step Ups  6" step 3x10 6" step 3x10 8" 3x10         Sit to stand   Eccentric focus 1x10 Eccentric 2x10 Eccentric 2x10 Bike  4min 5 min 5min         Ther Activity                                       Gait Training             Amb w/ SPC                          Modalities

## 2021-01-20 DIAGNOSIS — Z23 ENCOUNTER FOR IMMUNIZATION: ICD-10-CM

## 2021-01-21 ENCOUNTER — OFFICE VISIT (OUTPATIENT)
Dept: PHYSICAL THERAPY | Facility: CLINIC | Age: 81
End: 2021-01-21
Payer: MEDICARE

## 2021-01-21 DIAGNOSIS — Z96.642 AFTERCARE FOLLOWING LEFT HIP JOINT REPLACEMENT SURGERY: Primary | ICD-10-CM

## 2021-01-21 DIAGNOSIS — Z47.1 AFTERCARE FOLLOWING LEFT HIP JOINT REPLACEMENT SURGERY: Primary | ICD-10-CM

## 2021-01-21 PROCEDURE — 97140 MANUAL THERAPY 1/> REGIONS: CPT | Performed by: PHYSICAL THERAPIST

## 2021-01-21 PROCEDURE — 97110 THERAPEUTIC EXERCISES: CPT | Performed by: PHYSICAL THERAPIST

## 2021-01-21 NOTE — PROGRESS NOTES
Daily Note     Today's date: 2021  Patient name: Eladia Hubbard  : 1940  MRN: [id number]  Referring provider: Meng Kraft MD  Dx:   Encounter Diagnosis     ICD-10-CM    1  Aftercare following left hip joint replacement surgery  Z47 1     Z96 [id number]        Start Time: 1430  Stop Time: 1515  Total time in clinic (min): 45 minutes    Subjective: Patient reports he was extremely sore and stiff in both glutes after last session, though he feels he had a good workout  He took off yesterday from HEP at this time  Objective: See treatment diary below      Assessment: Tolerated treatment well  Patient demonstrates improving hip abduction strength, evident with lateral step up and single leg balance performance  Hip extension strength improving, able to progress to chair taps instead of full sit to stand  Requires contact guard with dynamic balance and unstable surface exercises  Patient demonstrated fatigue post treatment, exhibited good technique with therapeutic exercises and would benefit from continued PT      Plan: Continue per plan of care  Progress treatment as tolerated  Progress dynamic balance functional exercises as appropriate with irritability       Precautions:   Past Medical History:   Diagnosis Date    Measles     Pericarditis 1980           Manuals         PROM  BR w/ hamstring BR w/ hamstring BR w/ hamstring BR w/ hamstring                                               Neuro Re-Ed             Tandem Balance  [id number] 2x30 sec Walk x5 w/ CG Walk x5 CG        Romberg EO, EC             Tandem Walk             Walk w/ head turns             S/L stand  2x30 sec 2x30 sec 10x10 airex 3x20 B/L airex        Weight Dist Squat    [id number] 10x lev 4 Biodex                     Ther Ex             Bridges  1/2 bridge with ball squeeze 1/2 bridge w/ ball squeeze 1/2 bridge w/ ball 1/2 bridge w/ ball 2x10        SLR 2x10 2x10 2x10 2x10 3# 2x10 3#        Clams 2x10 OTB 2x10 GTB           LAQ  5# 2x10 7 5# 2x10          Lateral Walk   PTB 3x PTB 3x OTB 3x                     Mini Squats  2x10 2x10 3x10         Step Ups  6" step 3x10 6" step 3x10 8" 3x10 8" 3x10, 6" 2x10 lateral        Sit to stand   Eccentric focus 1x10 Eccentric 2x10 Eccentric 2x10 Ecc to chair w/ tap airex 3x8        Bike  4min 5 min 5min 5 min        Ther Activity                                       Gait Training             Amb w/ SPC                          Modalities

## 2021-01-23 ENCOUNTER — IMMUNIZATIONS (OUTPATIENT)
Dept: FAMILY MEDICINE CLINIC | Facility: HOSPITAL | Age: 81
End: 2021-01-23

## 2021-01-23 DIAGNOSIS — Z23 ENCOUNTER FOR IMMUNIZATION: Primary | ICD-10-CM

## 2021-01-23 PROCEDURE — 0001A SARS-COV-2 / COVID-19 MRNA VACCINE (PFIZER-BIONTECH) 30 MCG: CPT

## 2021-01-23 PROCEDURE — 91300 SARS-COV-2 / COVID-19 MRNA VACCINE (PFIZER-BIONTECH) 30 MCG: CPT

## 2021-01-26 ENCOUNTER — APPOINTMENT (OUTPATIENT)
Dept: PHYSICAL THERAPY | Facility: CLINIC | Age: 81
End: 2021-01-26
Payer: MEDICARE

## 2021-01-28 ENCOUNTER — OFFICE VISIT (OUTPATIENT)
Dept: PHYSICAL THERAPY | Facility: CLINIC | Age: 81
End: 2021-01-28
Payer: MEDICARE

## 2021-01-28 DIAGNOSIS — Z96.642 AFTERCARE FOLLOWING LEFT HIP JOINT REPLACEMENT SURGERY: Primary | ICD-10-CM

## 2021-01-28 DIAGNOSIS — Z47.1 AFTERCARE FOLLOWING LEFT HIP JOINT REPLACEMENT SURGERY: Primary | ICD-10-CM

## 2021-01-28 PROCEDURE — 97140 MANUAL THERAPY 1/> REGIONS: CPT | Performed by: PHYSICAL THERAPIST

## 2021-01-28 PROCEDURE — 97110 THERAPEUTIC EXERCISES: CPT | Performed by: PHYSICAL THERAPIST

## 2021-01-29 NOTE — PROGRESS NOTES
Daily Note     Today's date: 2021  Patient name: Misael Lindsey  : 1940  MRN: [id number]  Referring provider: Deidre Cornejo MD  Dx:   Encounter Diagnosis     ICD-10-CM    1  Aftercare following left hip joint replacement surgery  Z47 1     Z96 [id number]        Start Time: 1263  Stop Time: 1515  Total time in clinic (min): 50 minutes    Subjective: Patient reports he feels as though the surgery and his recovery process has gone "as well as it could have" so far  Objective: See treatment diary below      Assessment: Tolerated treatment well  Patient continues to improve with hip extension and abduction strength  Static balance in single limb support improving significantly  Patient demonstrated fatigue post treatment, exhibited good technique with therapeutic exercises and would benefit from continued PT      Plan: Continue per plan of care  Progress treatment as tolerated  Progress challenge with dynamic balance exercises       Precautions:   Past Medical History:   Diagnosis Date    Measles     Pericarditis 1980           Manuals        PROM  BR w/ hamstring BR w/ hamstring BR w/ hamstring BR w/ hamstring BR w/ hamstring                                              Neuro Re-Ed             Tandem Balance  [id number] 2x30 sec Walk x5 w/ CG Walk x5 CG        Romberg EO, EC             Tandem Walk             Walk w/ head turns             S/L stand  2x30 sec 2x30 sec 10x10 airex 3x20 B/L airex Biodex WB 30s 3 ea       Weight Dist Squat    9p04rip 10x lev 4 Biodex [id number] hold %                    Ther Ex             Bridges  1/2 bridge with ball squeeze 1/2 bridge w/ ball squeeze 1/2 bridge w/ ball 1/2 bridge w/ ball 2x10 bridge w/ ball 3x10       SLR 2x10 2x10 2x10 2x10 3# 2x10 3# 3x10 5#       Clams 2x10 OTB 2x10 GTB           LAQ  5# 2x10 7 5# 2x10          Lateral Walk   PTB 3x PTB 3x OTB 3x        Leg Press      55# SL 3x10, 110# B/L 3x10       Mini Squats 2x10 2x10 3x10         Step Ups  6" step 3x10 6" step 3x10 8" 3x10 8" 3x10, 6" 2x10 lateral 8# 3x10, 6" 2x10 lat       Sit to stand   Eccentric focus 1x10 Eccentric 2x10 Eccentric 2x10 Ecc to chair w/ tap airex 3x8 Ecc to chair no airex 3x5       Bike  4min 5 min 5min 5 min 5min       Ther Activity                                       Gait Training             Amb w/ SPC                          Modalities

## 2021-02-02 ENCOUNTER — APPOINTMENT (OUTPATIENT)
Dept: PHYSICAL THERAPY | Facility: CLINIC | Age: 81
End: 2021-02-02
Payer: MEDICARE

## 2021-02-04 ENCOUNTER — OFFICE VISIT (OUTPATIENT)
Dept: PHYSICAL THERAPY | Facility: CLINIC | Age: 81
End: 2021-02-04
Payer: MEDICARE

## 2021-02-04 DIAGNOSIS — Z47.1 AFTERCARE FOLLOWING LEFT HIP JOINT REPLACEMENT SURGERY: Primary | ICD-10-CM

## 2021-02-04 DIAGNOSIS — Z96.642 AFTERCARE FOLLOWING LEFT HIP JOINT REPLACEMENT SURGERY: Primary | ICD-10-CM

## 2021-02-04 PROCEDURE — 97140 MANUAL THERAPY 1/> REGIONS: CPT | Performed by: PHYSICAL THERAPIST

## 2021-02-04 PROCEDURE — 97110 THERAPEUTIC EXERCISES: CPT | Performed by: PHYSICAL THERAPIST

## 2021-02-04 NOTE — PROGRESS NOTES
Daily Note     Today's date: 2021  Patient name: Annita Hays  : 1940  MRN: [id number]  Referring provider: Hayley Butler MD  Dx:   Encounter Diagnosis     ICD-10-CM    1  Aftercare following left hip joint replacement surgery  Z47 1     Z96 [id number]        Start Time: 1430  Stop Time: 1515  Total time in clinic (min): 45 minutes    Subjective: Patient reports he has been a little stiff and it was difficult for him to walk through the snow the past few days, but he feels good overall  Objective: See treatment diary below      Assessment: Tolerated treatment well  Patient significantly challenged by Biodex squats at this point, but able to maintain WB percentage evenly  Patient demonstrated fatigue post treatment, exhibited good technique with therapeutic exercises and would benefit from continued PT      Plan: Continue per plan of care  Progress treatment as tolerated  Progress resistance as appropriate with hip extension and knee extension strength       Precautions:   Past Medical History:   Diagnosis Date    Measles 1952    Pericarditis 1980           Manuals        PROM BR w/ hamstring BR w/ hamstring BR w/ hamstring BR w/ hamstring BR w/ hamstring BR w/ hamstring                                              Neuro Re-Ed             Tandem Balance Walk x5 CG [id number] 2x30 sec Walk x5 w/ CG Walk x5 CG        Romberg EO, EC W/ pertubation 2' x2             Tandem Walk             Walk w/ head turns             S/L stand Biodex WB 30s 3 ea 2x30 sec 2x30 sec 10x10 airex 3x20 B/L airex Biodex WB 30s 3 ea       Weight Dist Squat 5x30 sec hold %   [id number] 10x lev 4 Biodex [id number] hold %                    Ther Ex             Bridges  1/2 bridge with ball squeeze 1/2 bridge w/ ball squeeze 1/2 bridge w/ ball 1/2 bridge w/ ball 2x10 bridge w/ ball 3x10       SLR 2x10  (5# 2x10 2x10 2x10 3# 2x10 3# 3x10 5#       Clams  2x10 GTB           LAQ  5# 2x10 7 5# 2x10 Lateral Walk   PTB 3x PTB 3x OTB 3x        Leg Press 60# 3x10 SL, 115# B/L 3x10     55# SL 3x10, 110# B/L 3x10       Mini Squats  2x10 2x10 3x10         Step Ups 8" 2x10, lateral 3x8 8" 6" step 3x10 6" step 3x10 8" 3x10 8" 3x10, 6" 2x10 lateral 8# 3x10, 6" 2x10 lat       Sit to stand   Eccentric focus 1x10 Eccentric 2x10 Eccentric 2x10 Ecc to chair w/ tap airex 3x8 Ecc to chair no airex 3x5       Bike 5 min 4min 5 min 5min 5 min 5min       Ther Activity                                       Gait Training             Amb w/ SPC                          Modalities

## 2021-02-09 ENCOUNTER — OFFICE VISIT (OUTPATIENT)
Dept: PHYSICAL THERAPY | Facility: CLINIC | Age: 81
End: 2021-02-09
Payer: MEDICARE

## 2021-02-09 ENCOUNTER — TELEPHONE (OUTPATIENT)
Dept: OBGYN CLINIC | Facility: OTHER | Age: 81
End: 2021-02-09

## 2021-02-09 DIAGNOSIS — Z96.642 AFTERCARE FOLLOWING LEFT HIP JOINT REPLACEMENT SURGERY: ICD-10-CM

## 2021-02-09 DIAGNOSIS — Z96.642 AFTERCARE FOLLOWING LEFT HIP JOINT REPLACEMENT SURGERY: Primary | ICD-10-CM

## 2021-02-09 DIAGNOSIS — Z47.1 AFTERCARE FOLLOWING LEFT HIP JOINT REPLACEMENT SURGERY: Primary | ICD-10-CM

## 2021-02-09 DIAGNOSIS — Z47.1 AFTERCARE FOLLOWING LEFT HIP JOINT REPLACEMENT SURGERY: ICD-10-CM

## 2021-02-09 PROCEDURE — 97112 NEUROMUSCULAR REEDUCATION: CPT | Performed by: PHYSICAL THERAPIST

## 2021-02-09 PROCEDURE — 97140 MANUAL THERAPY 1/> REGIONS: CPT | Performed by: PHYSICAL THERAPIST

## 2021-02-09 PROCEDURE — 97110 THERAPEUTIC EXERCISES: CPT | Performed by: PHYSICAL THERAPIST

## 2021-02-09 RX ORDER — AMOXICILLIN 500 MG/1
CAPSULE ORAL
Qty: 4 CAPSULE | Refills: 2 | Status: SHIPPED | OUTPATIENT
Start: 2021-02-09 | End: 2021-02-10

## 2021-02-09 NOTE — TELEPHONE ENCOUNTER
Patient has a dental procedure coming up on March 17th  He is wondering if you can / recommend him taking antibiotics again? If so please put in an rx      C/b # [phone]    Pharmacy is correct

## 2021-02-09 NOTE — PROGRESS NOTES
Daily Note     Today's date: 2021  Patient name: Kimberly Porter  : 1940  MRN: [id number]  Referring provider: Alvina Borges MD  Dx:   Encounter Diagnosis     ICD-10-CM    1  Aftercare following left hip joint replacement surgery  Z47 1     Z96 642        Start Time:   Stop Time: 1525  Total time in clinic (min): 60 minutes    Subjective: Patient reports he had significant soreness in his quads after LV, but had no pain in the joint  Additionally, he felt mild soreness in the lateral portion of his glutes  Objective: See treatment diary below      Assessment: Tolerated treatment well  Patient continues to be challenged by intensity of program   ROM steadily improving with hip abduction and flexion  Hip extension and abduction strength noticeably improved with gait  Patient demonstrated fatigue post treatment, exhibited good technique with therapeutic exercises and would benefit from continued PT      Plan: Continue per plan of care  Progress treatment as tolerated  Progress resistance with hip extension and abduction exercises as appropriate       Precautions:   Past Medical History:   Diagnosis Date    Measles     Pericarditis            Manuals        PROM BR w/ hamstring BR w/ hamstring, adductor stretch BR w/ hamstring BR w/ hamstring BR w/ hamstring BR w/ hamstring                                              Neuro Re-Ed             Tandem Balance Walk x5 CG Walk 5x CG 2x30 sec Walk x5 w/ CG Walk x5 CG        Romberg EO, EC W/ pertubation 2' x2  pertubation 2' x2           Tandem Walk             Walk w/ head turns             S/L stand Biodex WB 30s 3 ea 3x30 sec airex  2x30 sec 10x10 airex 3x20 B/L airex Biodex WB 30s 3 ea       Weight Dist Squat 5x30 sec hold %   [id number] 10x lev 4 Biodex [id number] hold %                    Ther Ex             Bridges  W/ add ball squeeze 2x10 1/2 bridge w/ ball squeeze 1/2 bridge w/ ball 1/2 bridge w/ ball 2x10 bridge w/ ball 3x10       SLR 2x10  (5# 3x10 5# 2x10 2x10 3# 2x10 3# 3x10 5#       Roro Cooler   7 5# 2x10          Lateral Walk   PTB 3x PTB 3x OTB 3x        Leg Press 60# 3x10 SL, 115# B/L 3x10 60# 3x10 SL, B/L 120# 3x10    55# SL 3x10, 110# B/L 3x10       Mini Squats   2x10 3x10         Step Ups 8" 2x10, lateral 3x8 8" 8" step 3x10, lateral 3x8 8" 6" step 3x10 8" 3x10 8" 3x10, 6" 2x10 lateral 8# 3x10, 6" 2x10 lat       Sit to stand   Eccentric focus 2x10 Eccentric 2x10 Eccentric 2x10 Ecc to chair w/ tap airex 3x8 Ecc to chair no airex 3x5       Bike 5 min 5min 5 min 5min 5 min 5min       Ther Activity                                       Gait Training             Amb w/ SPC                          Modalities

## 2021-02-11 ENCOUNTER — OFFICE VISIT (OUTPATIENT)
Dept: PHYSICAL THERAPY | Facility: CLINIC | Age: 81
End: 2021-02-11
Payer: MEDICARE

## 2021-02-11 DIAGNOSIS — Z47.1 AFTERCARE FOLLOWING LEFT HIP JOINT REPLACEMENT SURGERY: Primary | ICD-10-CM

## 2021-02-11 DIAGNOSIS — Z96.642 AFTERCARE FOLLOWING LEFT HIP JOINT REPLACEMENT SURGERY: Primary | ICD-10-CM

## 2021-02-11 PROCEDURE — 97112 NEUROMUSCULAR REEDUCATION: CPT | Performed by: PHYSICAL THERAPIST

## 2021-02-11 PROCEDURE — 97140 MANUAL THERAPY 1/> REGIONS: CPT | Performed by: PHYSICAL THERAPIST

## 2021-02-11 PROCEDURE — 97110 THERAPEUTIC EXERCISES: CPT | Performed by: PHYSICAL THERAPIST

## 2021-02-11 NOTE — PROGRESS NOTES
Daily Note     Today's date: 2021  Patient name: Rose Mary Jaramillo  : 1940  MRN: [id number]  Referring provider: Jarvis Becker MD  Dx:   Encounter Diagnosis     ICD-10-CM    1  Aftercare following left hip joint replacement surgery  Z47 1     [id number]                   Subjective: Patient reports he was significantly sore in his quads after the LV, though he feels improved overall  Objective: See treatment diary below      Assessment: Tolerated treatment well  Patient continues to progress with knee extension and hip abduction strength  Muscular endurance continues to steadily improve  Patient demonstrated fatigue post treatment, exhibited good technique with therapeutic exercises and would benefit from continued PT      Plan: Continue per plan of care  Progress treatment as tolerated  Progress extension strengthening as appropriate with irritability       Precautions:   Past Medical History:   Diagnosis Date    Measles     Pericarditis 1980           Manuals        PROM BR w/ hamstring BR w/ hamstring, adductor stretch BR w/ hamstring BR w/ hamstring BR w/ hamstring BR w/ hamstring                                              Neuro Re-Ed             Tandem Balance Walk x5 CG Walk 5x CG Walk 5x CG Walk x5 w/ CG Walk x5 CG        Romberg EO, EC W/ pertubation 2' x2  pertubation 2' x2 pertubation 2'x2          Tandem Walk             Walk w/ head turns             S/L stand Biodex WB 30s 3 ea 3x30 sec airex  2x30 sec airex 10x10 airex 3x20 B/L airex Biodex WB 30s 3 ea       Weight Dist Squat 5x30 sec hold %   [id number] 10x lev 4 Biodex [id number] hold %                    Ther Ex             Bridges  W/ add ball squeeze 2x10 1/2 bridge w/ ball squeeze 1/2 bridge w/ ball 1/2 bridge w/ ball 2x10 bridge w/ ball 3x10       SLR 2x10  (5# 3x10 5# 2x10 5# 2x10 3# 2x10 3# 3x10 5#       Clams             Standing Hip Ext on [place]   2x10 10# stair support Lateral Walk    PTB 3x OTB 3x        Leg Press 60# 3x10 SL, 115# B/L 3x10 60# 3x10 SL, B/L 120# 3x10 60# 3x10 SL, B/L 120# 3x10   55# SL 3x10, 110# B/L 3x10       Mini Squats    3x10         Step Ups 8" 2x10, lateral 3x8 8" 8" step 3x10, lateral 3x8 8" 8" step 3x10, lateral 3x8 8" 8" 3x10 8" 3x10, 6" 2x10 lateral 8# 3x10, 6" 2x10 lat       Sit to stand   Eccentric focus 2x10  Eccentric 2x10 Ecc to chair w/ tap airex 3x8 Ecc to chair no airex 3x5       Bike 5 min 5min 5 min 5min 5 min 5min       Ther Activity                                       Gait Training             Amb w/ SPC                          Modalities

## 2021-02-12 ENCOUNTER — IMMUNIZATIONS (OUTPATIENT)
Dept: FAMILY MEDICINE CLINIC | Facility: HOSPITAL | Age: 81
End: 2021-02-12

## 2021-02-12 DIAGNOSIS — Z23 ENCOUNTER FOR IMMUNIZATION: Primary | ICD-10-CM

## 2021-02-12 PROCEDURE — 0002A SARS-COV-2 / COVID-19 MRNA VACCINE (PFIZER-BIONTECH) 30 MCG: CPT

## 2021-02-12 PROCEDURE — 91300 SARS-COV-2 / COVID-19 MRNA VACCINE (PFIZER-BIONTECH) 30 MCG: CPT

## 2021-02-16 ENCOUNTER — APPOINTMENT (OUTPATIENT)
Dept: PHYSICAL THERAPY | Facility: CLINIC | Age: 81
End: 2021-02-16
Payer: MEDICARE

## 2021-02-18 ENCOUNTER — APPOINTMENT (OUTPATIENT)
Dept: PHYSICAL THERAPY | Facility: CLINIC | Age: 81
End: 2021-02-18
Payer: MEDICARE

## 2021-02-23 ENCOUNTER — APPOINTMENT (OUTPATIENT)
Dept: PHYSICAL THERAPY | Facility: CLINIC | Age: 81
End: 2021-02-23
Payer: MEDICARE

## 2021-02-23 DIAGNOSIS — Z96.642 STATUS POST TOTAL HIP REPLACEMENT, LEFT: ICD-10-CM

## 2021-02-25 ENCOUNTER — APPOINTMENT (OUTPATIENT)
Dept: PHYSICAL THERAPY | Facility: CLINIC | Age: 81
End: 2021-02-25
Payer: MEDICARE

## 2021-03-02 ENCOUNTER — TELEPHONE (OUTPATIENT)
Dept: OBGYN CLINIC | Facility: OTHER | Age: 81
End: 2021-03-02

## 2021-03-02 DIAGNOSIS — Z47.1 AFTERCARE FOLLOWING LEFT HIP JOINT REPLACEMENT SURGERY: Primary | ICD-10-CM

## 2021-03-02 DIAGNOSIS — Z96.642 AFTERCARE FOLLOWING LEFT HIP JOINT REPLACEMENT SURGERY: Primary | ICD-10-CM

## 2021-03-02 RX ORDER — AMOXICILLIN 500 MG/1
CAPSULE ORAL
Qty: 4 CAPSULE | Refills: 2 | Status: SHIPPED | OUTPATIENT
Start: 2021-03-02 | End: 2021-03-03

## 2021-03-02 NOTE — PROGRESS NOTES
Discharge: 3/2/2021    Maryann Tobias is a 79 y o  male who was seen for 9 visits following left hip arthroplasty and the impairments associated status post surgery  He has made significant improvements with hip abduction and extension strength, and dynamic balance activities related to activities of daily living  He ambulates without an assistive device and demonstrates good understanding of home exercise program   He has returned to [place] to be with his wife for the forseeable future and has self-discharged from therapy  He has been discharged from skilled physical therapy at this time

## 2021-03-25 ENCOUNTER — HOSPITAL ENCOUNTER (OUTPATIENT)
Dept: RADIOLOGY | Facility: HOSPITAL | Age: 81
Discharge: HOME | End: 2021-03-25
Attending: ORTHOPAEDIC SURGERY
Payer: MEDICARE

## 2021-03-25 ENCOUNTER — OFFICE VISIT (OUTPATIENT)
Dept: OBGYN CLINIC | Facility: HOSPITAL | Age: 81
End: 2021-03-25

## 2021-03-25 VITALS
SYSTOLIC BLOOD PRESSURE: 134 MMHG | BODY MASS INDEX: 27.81 KG/M2 | WEIGHT: 177.2 LBS | HEART RATE: 57 BPM | HEIGHT: 67 IN | DIASTOLIC BLOOD PRESSURE: 76 MMHG

## 2021-03-25 DIAGNOSIS — Z48.89 AFTERCARE FOLLOWING SURGERY: Primary | ICD-10-CM

## 2021-03-25 DIAGNOSIS — Z48.89 AFTERCARE FOLLOWING SURGERY: ICD-10-CM

## 2021-03-25 PROCEDURE — 73502 X-RAY EXAM HIP UNI 2-3 VIEWS: CPT

## 2021-03-25 PROCEDURE — 99024 POSTOP FOLLOW-UP VISIT: CPT | Performed by: ORTHOPAEDIC SURGERY

## 2021-03-25 NOTE — PROGRESS NOTES
79 y o male presents for Two and half months postoperative visit status post left  Anterior KIRBY   Due to left hip femoral neck fracture  Patient states doing very well he has no issues or problems regarding his left lower extremity he does state utilizing shoe lift on his  perceived lower extremity due to leg length discrepancy  Patient states he has completed formal physical therapy at this time he states he is very happy regarding outcome of his left total hip arthroplasty       Review of Systems  Review of systems negative unless otherwise specified in HPI    Past Medical History  Past Medical History:   Diagnosis Date    Measles 1952    Pericarditis 1980       Past Surgical History  Past Surgical History:   Procedure Laterality Date    HIP ARTHROPLASTY Left 12/23/2020    Procedure: ANTERIOR APPROACH ARTHROPLASTY HIP TOTAL;  Surgeon: Annemarie Draper MD;  Location: [facility];  Service: Orthopedics    INGUINAL HERNIA REPAIR Bilateral     1995    KNEE SURGERY      knee arthroscopy    PROSTATE SURGERY  2002    Prostatectomy    SPINE SURGERY      Micro diskectomy November 2010 and lumbar l[facility]ctomy May 2014 [facility]       Current Medications  Current Outpatient Medications on File Prior to Visit   Medication Sig Dispense Refill    b complex vitamins tablet Be 5/B6 100/500      Blood Pressure Monitoring KIT Use 2 (two) times a day Omron, upper arm, regular size 1 kit 0    buPROPion (WELLBUTRIN XL) 300 mg 24 hr tablet       cholecalciferol (VITAMIN D3) 1,000 units tablet Take 1 tablet (1,000 Units total) by mouth daily      Chromium 500 MCG TABS One tablet daily  0    clonazePAM (KlonoPIN) 0 5 mg tablet Take 0 5 mg by mouth daily at bedtime as needed for anxiety       co-enzyme Q-10 50 MG capsule Take 1 capsule (50 mg total) by mouth daily      Cyanocobalamin (B-12) 1000 MCG TABS One tab daily      enoxaparin (LOVENOX) 40 mg/0 4 mL 1 subcutaneous injection daily x 28 days AFTER surgery 28 Syringe 0    gabapentin (NEURONTIN) 100 mg capsule Take 1 tablet at bedtime  Titrate up to 3 tablets nightly as needed  90 capsule 3    Magnesium 300 MG CAPS One tab daily  0    niacin 500 mg tablet Take 1 tablet (500 mg total) by mouth daily with breakfast      Omega-3 Fatty Acids (Fish Oil) 1200 MG CPDR bid      pantoprazole (PROTONIX) 40 mg tablet Take 40 mg by mouth daily      Potassium 99 MG TABS One tab daily 330 each 0    rOPINIRole (REQUIP XL) 2 MG 24 hr tablet Take 2 mg by mouth daily      rosuvastatin (CRESTOR) 10 MG tablet Take 1 tablet (10 mg total) by mouth daily 90 tablet 3    tadalafil (CIALIS) 5 MG tablet Take 5 mg by mouth daily As directed      Zinc 50 MG CAPS Zinc/copper supplement 1 tab daily  0     No current facility-administered medications on file prior to visit  Recent Labs [facility])  0   Lab Value Date/Time    HCT 39 5 12/26/2020 0605    HGB 12 9 12/26/2020 0605    WBC 8 04 12/26/2020 0605    INR 1 07 12/23/2020 0306    ESR 24 (H) 12/23/2020 0405    HGBA1C 5 8 (H) 12/03/2020 0848         Physical exam  · General: Awake, Alert, Oriented  · Eyes: Pupils equal, round and reactive to light  · Heart: regular rate and rhythm  · Lungs: No audible wheezing  ·   left Lower extremity  ·   Examination patient's left lower extremity well-healed anterior incision active hip flexion greater than 100 degrees hip flexion adduction abduction strength 5/5 no pain with internal-external rotation to 30 degrees with hip flexed 90 degrees active left knee full extension ankle dorsi plantar flexion strength 5/5 sensation intact distal pulses present left lower extremity    Imaging   x-ray images of the left hip reveal well located left total hip arthroplasty in excellent alignment no evidence of loosening or bony abnormality       assessment:   Status post 2 and half months left  Total hip arthroplasty for femoral neck fracture    Plan:   Weightbearing as tolerated left lower extremity   Case discussed with Dr Dalton Woodruff in 9 months time or sooner if needed   Lifetime dental antibiotic prophylaxis recommended   Repeat x-rays left hip at next office visit

## 2021-04-27 ENCOUNTER — OFFICE VISIT (OUTPATIENT)
Dept: FAMILY MEDICINE CLINIC | Facility: CLINIC | Age: 81
End: 2021-04-27
Payer: MEDICARE

## 2021-04-27 VITALS
TEMPERATURE: 97.5 F | DIASTOLIC BLOOD PRESSURE: 70 MMHG | BODY MASS INDEX: 27.21 KG/M2 | HEART RATE: 66 BPM | WEIGHT: 173.4 LBS | OXYGEN SATURATION: 95 % | HEIGHT: 67 IN | SYSTOLIC BLOOD PRESSURE: 138 MMHG

## 2021-04-27 DIAGNOSIS — R79.9 ABNORMAL FINDING OF BLOOD CHEMISTRY, UNSPECIFIED: ICD-10-CM

## 2021-04-27 DIAGNOSIS — M89.9 DISORDER OF BONE, UNSPECIFIED: ICD-10-CM

## 2021-04-27 DIAGNOSIS — I77.810 ASCENDING AORTA DILATATION (HCC): ICD-10-CM

## 2021-04-27 DIAGNOSIS — F32.0 MAJOR DEPRESSIVE DISORDER, SINGLE EPISODE, MILD (HCC): ICD-10-CM

## 2021-04-27 DIAGNOSIS — Z12.5 ENCOUNTER FOR SCREENING FOR MALIGNANT NEOPLASM OF PROSTATE: ICD-10-CM

## 2021-04-27 DIAGNOSIS — F41.9 ANXIETY DISORDER, UNSPECIFIED TYPE: ICD-10-CM

## 2021-04-27 DIAGNOSIS — Z92.29 COVID-19 VACCINE SERIES COMPLETED: ICD-10-CM

## 2021-04-27 DIAGNOSIS — C61 PROSTATE CANCER (HCC): ICD-10-CM

## 2021-04-27 PROCEDURE — G0438 PPPS, INITIAL VISIT: HCPCS | Performed by: INTERNAL MEDICINE

## 2021-04-27 PROCEDURE — 99213 OFFICE O/P EST LOW 20 MIN: CPT | Performed by: INTERNAL MEDICINE

## 2021-04-27 PROCEDURE — 1123F ACP DISCUSS/DSCN MKR DOCD: CPT | Performed by: INTERNAL MEDICINE

## 2021-04-27 RX ORDER — ROPINIROLE 0.5 MG/1
0.5 TABLET, FILM COATED ORAL 3 TIMES DAILY
COMMUNITY

## 2021-04-27 RX ORDER — ROPINIROLE 2 MG/1
2 TABLET, FILM COATED ORAL 3 TIMES DAILY
COMMUNITY

## 2021-04-27 NOTE — PROGRESS NOTES
Assessment and Plan:     Problem List Items Addressed This Visit     None           Preventive health issues were discussed with patient, and age appropriate screening tests were ordered as noted in patient's After Visit Summary  Personalized health advice and appropriate referrals for health education or preventive services given if needed, as noted in patient's After Visit Summary       History of Present Illness:     Patient presents for Medicare Annual Wellness visit    Patient Care Team:  Rios Leon MD as PCP - General (Family Medicine)     Problem List:     Patient Active Problem List   Diagnosis    Prostate cancer [facility])    Marks's esophagus without dysplasia    Mild depression ([address] )    Chronic bilateral low back pain without sciatica    Restless leg syndrome    Hyperlipidemia, unspecified    Elevated coronary artery calcium score    Bilateral carotid artery stenosis    Post-procedural erectile dysfunction    Primary insomnia    Ascending aorta dilatation (HCC)    Stenosis of left iliac artery ([address] )    Hiatal hernia    Tendinopathy of right rotator cuff    Closed fracture of neck of left femur ([address] )    Fever      Past Medical and Surgical History:     Past Medical History:   Diagnosis Date    Measles 1952    Pericarditis 1980     Past Surgical History:   Procedure Laterality Date    HIP ARTHROPLASTY Left 12/23/2020    Procedure: ANTERIOR APPROACH ARTHROPLASTY HIP TOTAL;  Surgeon: Berenice Odell MD;  [facility]: [facility];  Service: Orthopedics    INGUINAL HERNIA REPAIR Bilateral     1995    KNEE SURGERY      knee arthroscopy    PROSTATE SURGERY  2002    Prostatectomy    SPINE SURGERY      Micro diskectomy November 2010 and [facility]      Family History:     Family History   Problem Relation Age of Onset    Stroke Mother     Hypertension Mother     Prostate cancer Brother     Glaucoma Brother     No Known Problems Daughter     Stroke Maternal Grandmother     Stroke Maternal Aunt     Coronary artery disease Brother     No Known Problems Daughter       Social History:     E-Cigarette/Vaping    E-Cigarette Use Never User      E-Cigarette/Vaping Substances    Nicotine No     THC No     CBD No     Flavoring No     Other No     Unknown No      Social History     Socioeconomic History    Marital status: /Civil Union     Spouse name: None    Number of children: None    Years of education: None    Highest education level: None   Occupational History    None   Social Needs    Financial resource strain: None    Food insecurity     Worry: None     Inability: None    Transportation needs     Medical: None     Non-medical: None   Tobacco Use    Smoking status: Never Smoker    Smokeless tobacco: Never Used   Substance and Sexual Activity    Alcohol use: Not Currently     Comment: Recovering alcoholic  Quit drinking 1992       Drug use: Never    Sexual activity: Not Currently   Lifestyle    Physical activity     Days per week: None     Minutes per session: None    Stress: None   Relationships    Social connections     Talks on phone: None     Gets together: None     Attends Congregational service: None     Active member of club or organization: None     Attends meetings of clubs or organizations: None     Relationship status: None    Intimate partner violence     Fear of current or ex partner: None     Emotionally abused: None     Physically abused: None     Forced sexual activity: None   Other Topics Concern    None   Social History Narrative    None      Medications and Allergies:     Current Outpatient Medications   Medication Sig Dispense Refill    acyclovir (ZOVIRAX) 400 MG tablet TAKE 1 TABLET FIVE TIMES A DAY      b complex vitamins tablet Be 5/B6 100/500      Blood Pressure Monitoring KIT Use 2 (two) times a day Omron, upper arm, regular size 1 kit 0    buPROPion (WELLBUTRIN XL) 300 mg 24 hr tablet  cholecalciferol (VITAMIN D3) 1,000 units tablet Take 1 tablet (1,000 Units total) by mouth daily      Chromium 500 MCG TABS One tablet daily  0    clonazePAM (KlonoPIN) 0 5 mg tablet Take 0 5 mg by mouth daily at bedtime as needed for anxiety       co-enzyme Q-10 50 MG capsule Take 1 capsule (50 mg total) by mouth daily      Cyanocobalamin (B-12) 1000 MCG TABS One tab daily      enoxaparin (LOVENOX) 40 mg/0 4 mL 1 subcutaneous injection daily x 28 days AFTER surgery 28 Syringe 0    gabapentin (NEURONTIN) 100 mg capsule Take 1 tablet at bedtime  Titrate up to 3 tablets nightly as needed  90 capsule 3    Magnesium 300 MG CAPS One tab daily  0    niacin 500 mg tablet Take 1 tablet (500 mg total) by mouth daily with breakfast      Omega-3 Fatty Acids (Fish Oil) 1200 MG CPDR bid      pantoprazole (PROTONIX) 40 mg tablet Take 40 mg by mouth daily      Potassium 99 MG TABS One tab daily 330 each 0    rOPINIRole (REQUIP) 0 5 mg tablet Take 0 5 mg by mouth 3 (three) times a day      rOPINIRole (REQUIP) 2 mg tablet Take 2 mg by mouth 3 (three) times a day      rosuvastatin (CRESTOR) 10 MG tablet Take 1 tablet (10 mg total) by mouth daily 90 tablet 3    tadalafil (CIALIS) 5 MG tablet Take 5 mg by mouth daily As directed      Zinc 50 MG CAPS Zinc/copper supplement 1 tab daily  0     No current facility-administered medications for this visit  Allergies   Allergen Reactions    Other      Hayfever, tree pollen        Immunizations:     Immunization History   Administered Date(s) Administered    INFLUENZA 02/10/2018, 09/19/2020    Pneumococcal Polysaccharide PPV23 02/10/2018    SARS-CoV-2 / COVID-19 mRNA IM (Pfizer-BioNTech) 01/23/2021, 02/12/2021    Zoster Vaccine Recombinant 10/14/2019      Health Maintenance:         Topic Date Due    Hepatitis C Screening  Completed         Topic Date Due    DTaP,Tdap,and Td Vaccines (1 - Tdap) Never done      Medicare Health Risk Assessment:     /70 (BP Location: Left arm, Patient Position: Sitting, Cuff Size: Adult)   Pulse 66   Temp 97 5 °F (36 4 °C) (Temporal)   Ht 5' 7" (1 702 m)   Wt 78 7 kg (173 lb 6 4 oz)   SpO2 95%   BMI 27 16 kg/m²      Ava Munoz is here for his Subsequent Wellness visit  Health Risk Assessment:   Patient rates overall health as very good  Patient feels that their physical health rating is much better  Patient is satisfied with their life  Eyesight was rated as same  Hearing was rated as same  Patient feels that their emotional and mental health rating is same  Patients states they are never, rarely angry  Patient states they are sometimes unusually tired/fatigued  Pain experienced in the last 7 days has been none  Patient states that he has experienced no weight loss or gain in last 6 months  Fall Risk Screening: In the past year, patient has experienced: history of falling in past year    Number of falls: 1  Injured during fall?: Yes    Feels unsteady when standing or walking?: No    Worried about falling?: Yes      Home Safety:  Patient does not have trouble with stairs inside or outside of their home  Patient has working smoke alarms and has working carbon monoxide detector  Home safety hazards include: none  Nutrition:   Current diet is Regular  Medications:   Patient is currently taking over-the-counter supplements  OTC medications include: see medication list  Patient is able to manage medications  Activities of Daily Living (ADLs)/Instrumental Activities of Daily Living (IADLs):   Walk and transfer into and out of bed and chair?: Yes  Dress and groom yourself?: Yes    Bathe or shower yourself?: Yes    Feed yourself?  Yes  Do your laundry/housekeeping?: Yes  Manage your money, pay your bills and track your expenses?: Yes  Make your own meals?: Yes    Do your own shopping?: Yes    Previous Hospitalizations:   Any hospitalizations or ED visits within the last 12 months?: Yes    How many hospitalizations have you had in the last year?: 1-2    Advance Care Planning:   Living will: Yes    Advanced directive: Yes    Five wishes given: Yes      PREVENTIVE SCREENINGS      Cardiovascular Screening:    General: Screening Not Indicated and History Lipid Disorder      Diabetes Screening:     General: Screening Current      Prostate Cancer Screening:    General: History Prostate Cancer and Screening Not Indicated      Lung Cancer Screening:     General: Screening Not Indicated      Hepatitis C Screening:    General: Screening Current    Screening, Brief Intervention, and Referral to Treatment (SBIRT)    Screening  Typical number of drinks in a day: 0  Typical number of drinks in a week: 0  Interpretation: Low risk drinking behavior      Single Item Drug Screening:  How often have you used an illegal drug (including marijuana) or a prescription medication for non-medical reasons in the past year? never    Single Item Drug Screen Score: 0  Interpretation: Negative screen for possible drug use disorder      Aleksandra Martin MD

## 2021-04-28 NOTE — PROGRESS NOTES
Assessment/Plan:    No problem-specific Assessment & Plan notes found for this encounter  Diagnoses and all orders for this visit:    BMI 27 0-27 9,adult  -     Comprehensive metabolic panel; Future  -     TSH, 3rd generation with Free T4 reflex; Future  -     CBC and differential; Future  -     Lipid panel; Future  -     Vitamin D 25 hydroxy; Future  -     UA (URINE) with reflex to Scope  -     HEMOGLOBIN A1C W/ EAG ESTIMATION; Future    Major depressive disorder, single episode, mild (HCC)    Ascending aorta dilatation (HCC)    Prostate cancer (HCC)  -     PSA, Total Screen; Future    COVID-19 vaccine series completed  -     SARS CoV-2 ANTIBODY,IgG; Future    Anxiety disorder, unspecified type   -     TSH, 3rd generation with Free T4 reflex; Future    Abnormal finding of blood chemistry, unspecified   -     Lipid panel; Future  -     HEMOGLOBIN A1C W/ EAG ESTIMATION; Future    Disorder of bone, unspecified   -     Vitamin D 25 hydroxy; Future    Encounter for screening for malignant neoplasm of prostate   -     PSA, Total Screen; Future    Other orders  -     rOPINIRole (REQUIP) 0 5 mg tablet; Take 0 5 mg by mouth 3 (three) times a day  -     rOPINIRole (REQUIP) 2 mg tablet; Take 2 mg by mouth 3 (three) times a day          Subjective:      Patient ID: Zbigniew Samayoa is a 79 y o  male  Patient came today for annual wellness visit and to follow-up on his chronic issues  He denies any active complaints  No exertional shortness of breath, palpitation or substernal chest pain  He does not report any abdominal pain or urinary symptoms  Patient got his COVID-19 vaccination recently  He is up-to-date of his  vaccination except of Tdap  We decided that because of the recent COVID-19 vaccine will hold off on that for now  He does not pursue colon cancer screening any more because of the age  He would like to continue screening for prostate cancer    Patient had total hip arthroplasty recently because of the fracture and he does not report any pain in his left hip  The following portions of the patient's history were reviewed and updated as appropriate: allergies, current medications, past family history, past medical history, past social history, past surgical history, and problem list     Review of Systems   Constitutional: Negative for chills, fatigue and fever  Respiratory: Negative for cough, chest tightness and shortness of breath  Cardiovascular: Negative for chest pain, palpitations and leg swelling  Gastrointestinal: Negative for abdominal distention, abdominal pain, blood in stool, constipation, diarrhea and nausea  Genitourinary: Negative for difficulty urinating and dysuria  Musculoskeletal: Positive for back pain (Chronic)  Negative for arthralgias, gait problem, joint swelling, myalgias and neck pain  Skin: Negative for rash  Neurological: Negative for dizziness, weakness, numbness and headaches  Psychiatric/Behavioral: Negative for agitation  Objective:      /70 (BP Location: Left arm, Patient Position: Sitting, Cuff Size: Adult)   Pulse 66   Temp 97 5 °F (36 4 °C) (Temporal)   Ht 5' 7" (1 702 m)   Wt 78 7 kg (173 lb 6 4 oz)   SpO2 95%   BMI 27 16 kg/m²          Physical Exam  Constitutional:       General: He is not in acute distress  Appearance: He is well-developed  He is not diaphoretic  HENT:      Head: Normocephalic  Eyes:      General:         Right eye: No discharge  Left eye: No discharge  Pupils: Pupils are equal, round, and reactive to light  Neck:      Thyroid: No thyromegaly  Trachea: No tracheal deviation  Cardiovascular:      Rate and Rhythm: Normal rate and regular rhythm  Heart sounds: Normal heart sounds  No murmur  Pulmonary:      Effort: Pulmonary effort is normal  No respiratory distress  Breath sounds: No wheezing or rales  Abdominal:      General: There is no distension        Palpations: Abdomen is soft  Tenderness: There is no abdominal tenderness  Musculoskeletal: Normal range of motion  Lymphadenopathy:      Cervical: No cervical adenopathy  Skin:     General: Skin is warm  Findings: No erythema  Neurological:      Mental Status: He is alert and oriented to person, place, and time  Cranial Nerves: No cranial nerve deficit  Psychiatric:         Thought Content: Thought content normal          Judgment: Judgment normal                Current Outpatient Medications:     acyclovir (ZOVIRAX) 400 MG tablet, TAKE 1 TABLET FIVE TIMES A DAY, Disp: , Rfl:     b complex vitamins tablet, Be 5/B6 100/500, Disp: , Rfl:     Blood Pressure Monitoring KIT, Use 2 (two) times a day Omron, upper arm, regular size, Disp: 1 kit, Rfl: 0    buPROPion (WELLBUTRIN XL) 300 mg 24 hr tablet, , Disp: , Rfl:     cholecalciferol (VITAMIN D3) 1,000 units tablet, Take 1 tablet (1,000 Units total) by mouth daily, Disp:  , Rfl:     Chromium 500 MCG TABS, One tablet daily, Disp:  , Rfl: 0    clonazePAM (KlonoPIN) 0 5 mg tablet, Take 0 5 mg by mouth daily at bedtime as needed for anxiety , Disp: , Rfl:     co-enzyme Q-10 50 MG capsule, Take 1 capsule (50 mg total) by mouth daily, Disp:  , Rfl:     Cyanocobalamin (B-12) 1000 MCG TABS, One tab daily, Disp:  , Rfl:     gabapentin (NEURONTIN) 100 mg capsule, Take 1 tablet at bedtime  Titrate up to 3 tablets nightly as needed  , Disp: 90 capsule, Rfl: 3    Magnesium 300 MG CAPS, One tab daily, Disp:  , Rfl: 0    niacin 500 mg tablet, Take 1 tablet (500 mg total) by mouth daily with breakfast, Disp:  , Rfl:     Omega-3 Fatty Acids (Fish Oil) 1200 MG CPDR, bid, Disp:  , Rfl:     pantoprazole (PROTONIX) 40 mg tablet, Take 40 mg by mouth daily, Disp: , Rfl:     Potassium 99 MG TABS, One tab daily, Disp: 330 each, Rfl: 0    rOPINIRole (REQUIP) 0 5 mg tablet, Take 0 5 mg by mouth 3 (three) times a day, Disp: , Rfl:     rOPINIRole (REQUIP) 2 mg tablet, Take 2 mg by mouth 3 (three) times a day, Disp: , Rfl:     rosuvastatin (CRESTOR) 10 MG tablet, Take 1 tablet (10 mg total) by mouth daily, Disp: 90 tablet, Rfl: 3    tadalafil (CIALIS) 5 MG tablet, Take 5 mg by mouth daily As directed, Disp: , Rfl:     Zinc 50 MG CAPS, Zinc/copper supplement 1 tab daily  , Disp:  , Rfl: 0

## 2021-05-13 DIAGNOSIS — G89.29 CHRONIC BILATERAL LOW BACK PAIN WITHOUT SCIATICA: ICD-10-CM

## 2021-05-13 DIAGNOSIS — M54.50 CHRONIC BILATERAL LOW BACK PAIN WITHOUT SCIATICA: ICD-10-CM

## 2021-05-14 RX ORDER — GABAPENTIN 100 MG/1
CAPSULE ORAL
Qty: 90 CAPSULE | Refills: 3 | Status: SHIPPED | OUTPATIENT
Start: 2021-05-14 | End: 2021-10-06

## 2021-08-30 ENCOUNTER — TELEPHONE (OUTPATIENT)
Dept: FAMILY MEDICINE CLINIC | Facility: CLINIC | Age: 81
End: 2021-08-30

## 2021-08-30 NOTE — TELEPHONE ENCOUNTER
Pt left  in regards of covid booster  He wanted to know when he can get the booster and where  Please advise

## 2021-08-31 NOTE — TELEPHONE ENCOUNTER
At this point, the patient does not have any immunocompromised issues as listed below  Currently, the CDC is mentioning that a booster could be given at 8 months, starting in mid September but we are still awaiting the final answer here  If you received 2 doses of the Pfizer or [employer] vaccine, CDC is currently recommending that moderately to severely immunocompromised people receive an additional dose   This includes people who have:  Been receiving active cancer treatment for tumors or cancers of the blood  Received an organ transplant and are taking medicine to suppress the immune system  Received a stem cell transplant within the last 2 years or are taking medicine to suppress the immune system  Moderate or severe primary immunodeficiency (such as DiGeorge syndrome, Wiskott-Whick syndrome)  Advanced or untreated HIV infection  Active treatment with high-dose corticosteroids or other drugs that may suppress your immune response    Source [CDC]: COVID-19 Vaccines for Moderately to Severely Immunocompromised People    Based off your medical history, you currently do not meet this criteria    If you meet the current CDC criteria, you have some options in how to schedule a 3rd dose:  Sign in or sign up for [facility] MyChart  Call [phone] ([phone]), option [phone]  Call the office and a staff member can help get you scheduled    For more information, you can visit [facility]'s COVID-19 Vaccine Website

## 2021-09-05 ENCOUNTER — NURSE TRIAGE (OUTPATIENT)
Dept: OTHER | Facility: OTHER | Age: 81
End: 2021-09-05

## 2021-09-05 NOTE — TELEPHONE ENCOUNTER
Regarding: COVID swab travel / exposure  ----- Message from Nga Hdz sent at 9/4/2021  6:46 PM EDT -----  "I need a travel COVID swab and I was also exposed to [place] on Thursday, I found out there was someone in the room who tested positive today "

## 2021-09-06 ENCOUNTER — NURSE TRIAGE (OUTPATIENT)
Dept: OTHER | Facility: OTHER | Age: 81
End: 2021-09-06

## 2021-09-06 DIAGNOSIS — Z20.822 EXPOSURE TO COVID-19 VIRUS: Primary | ICD-10-CM

## 2021-09-06 NOTE — TELEPHONE ENCOUNTER
Regarding: COVID travel / exposure  ----- Message from Fatemehginger Head sent at 9/5/2021  4:13 PM EDT -----  Pt returning phone call from yesterday    "I need a travel COVID swab and I was also exposed to [place] on Thursday, I found out there was someone in the room who tested positive today "

## 2021-09-06 NOTE — TELEPHONE ENCOUNTER
Patient needs a COVID test for travel to [facility] on Wednesday  Patient informed a test could be ordered for him due to his exposure however the results may not be available for travel date  Patient is going to have a rapid test done elsewhere  Reason for Disposition   [1] CLOSE CONTACT COVID-19 EXPOSURE within last 14 days AND [2] NO symptoms    Answer Assessment - Initial Assessment Questions  Were you within 6 feet or less, for up to 15 minutes or more with a person that has a confirmed COVID-19 test? Yes     What was the date of your exposure?  9/2     Are you experiencing any symptoms attributed to the virus?  (Assess for SOB, cough, fever, difficulty breathing) Denies     HIGH RISK: Do you have any history heart or lung conditions, weakened immune system, diabetes, Asthma, CHF, HIV, COPD, Chemo, renal failure, sickle cell, etc? Denies    Protocols used: CORONAVIRUS (COVID-19) EXPOSURE-ADULT-AH

## 2021-09-07 NOTE — TELEPHONE ENCOUNTER
This patient needs a COVID test   He can go to our [facility] site to be tested    The site opens at 5:00 p m  on Wednesday

## 2021-09-07 NOTE — ADDENDUM NOTE
Addended by: Gage Mason on: 9/7/2021 03:25 PM     Modules accepted: Orders, SmartSet
(2) good, crying

## 2021-10-02 DIAGNOSIS — M54.50 CHRONIC BILATERAL LOW BACK PAIN WITHOUT SCIATICA: ICD-10-CM

## 2021-10-02 DIAGNOSIS — G89.29 CHRONIC BILATERAL LOW BACK PAIN WITHOUT SCIATICA: ICD-10-CM

## 2021-10-06 RX ORDER — GABAPENTIN 100 MG/1
CAPSULE ORAL
Qty: 90 CAPSULE | Refills: 3 | Status: SHIPPED | OUTPATIENT
Start: 2021-10-06 | End: 2022-07-18

## 2022-01-02 NOTE — TELEPHONE ENCOUNTER
[facility] ambulance called to transfer the patient to [address] # [phone]. Patient is having another dental procedure done on 03-09 , week from today and he is not sure if he will need more antibiotics  If so please send to pharmacy      C/b # [phone] , Can leave a message

## 2022-03-11 DIAGNOSIS — Z00.00 ENCOUNTER FOR PREVENTIVE HEALTH EXAMINATION: ICD-10-CM

## 2022-03-22 ENCOUNTER — LAB (OUTPATIENT)
Dept: LAB | Facility: CLINIC | Age: 82
End: 2022-03-22

## 2022-03-22 ENCOUNTER — HOSPITAL ENCOUNTER (OUTPATIENT)
Dept: NON INVASIVE DIAGNOSTICS | Facility: CLINIC | Age: 82
Discharge: HOME | End: 2022-03-22

## 2022-03-22 ENCOUNTER — OFFICE VISIT (OUTPATIENT)
Dept: FAMILY MEDICINE CLINIC | Facility: CLINIC | Age: 82
End: 2022-03-22

## 2022-03-22 VITALS
SYSTOLIC BLOOD PRESSURE: 111 MMHG | WEIGHT: 166 LBS | HEIGHT: 67 IN | DIASTOLIC BLOOD PRESSURE: 66 MMHG | HEART RATE: 60 BPM | BODY MASS INDEX: 26.06 KG/M2

## 2022-03-22 VITALS
HEART RATE: 64 BPM | RESPIRATION RATE: 14 BRPM | WEIGHT: 166.2 LBS | BODY MASS INDEX: 26.09 KG/M2 | HEIGHT: 67 IN | DIASTOLIC BLOOD PRESSURE: 66 MMHG | SYSTOLIC BLOOD PRESSURE: 122 MMHG

## 2022-03-22 VITALS — SYSTOLIC BLOOD PRESSURE: 122 MMHG | DIASTOLIC BLOOD PRESSURE: 78 MMHG

## 2022-03-22 DIAGNOSIS — F32.A MILD DEPRESSION: ICD-10-CM

## 2022-03-22 DIAGNOSIS — Z00.00 ENCOUNTER FOR PREVENTIVE HEALTH EXAMINATION: ICD-10-CM

## 2022-03-22 DIAGNOSIS — Z00.00 WELL ADULT EXAM: Primary | ICD-10-CM

## 2022-03-22 DIAGNOSIS — N52.9 ERECTILE DYSFUNCTION, UNSPECIFIED ERECTILE DYSFUNCTION TYPE: ICD-10-CM

## 2022-03-22 DIAGNOSIS — C61 PROSTATE CANCER (HCC): ICD-10-CM

## 2022-03-22 DIAGNOSIS — E78.2 MIXED HYPERLIPIDEMIA: ICD-10-CM

## 2022-03-22 DIAGNOSIS — K22.70 BARRETT'S ESOPHAGUS WITHOUT DYSPLASIA: ICD-10-CM

## 2022-03-22 DIAGNOSIS — I65.23 BILATERAL CAROTID ARTERY STENOSIS: ICD-10-CM

## 2022-03-22 DIAGNOSIS — G25.81 RESTLESS LEG SYNDROME: ICD-10-CM

## 2022-03-22 DIAGNOSIS — F51.01 PRIMARY INSOMNIA: ICD-10-CM

## 2022-03-22 DIAGNOSIS — G89.29 CHRONIC BILATERAL LOW BACK PAIN WITHOUT SCIATICA: ICD-10-CM

## 2022-03-22 DIAGNOSIS — I77.1 STENOSIS OF LEFT ILIAC ARTERY (HCC): ICD-10-CM

## 2022-03-22 DIAGNOSIS — M54.50 CHRONIC BILATERAL LOW BACK PAIN WITHOUT SCIATICA: ICD-10-CM

## 2022-03-22 PROBLEM — R50.9 FEVER: Status: RESOLVED | Noted: 2020-12-25 | Resolved: 2022-03-22

## 2022-03-22 LAB
25(OH)D3 SERPL-MCNC: 44.8 NG/ML (ref 30–100)
ALBUMIN SERPL BCP-MCNC: 3.5 G/DL (ref 3.5–5)
ALP SERPL-CCNC: 80 U/L (ref 46–116)
ALT SERPL W P-5'-P-CCNC: 40 U/L (ref 12–78)
ANION GAP SERPL CALCULATED.3IONS-SCNC: 5 MMOL/L (ref 4–13)
AORTIC ROOT: 3.4 CM
APICAL FOUR CHAMBER EJECTION FRACTION: 57 %
ASCENDING AORTA: 3.9 CM (ref 1.98–2.96)
AST SERPL W P-5'-P-CCNC: 30 U/L (ref 5–45)
ATRIAL RATE: 62 BPM
AV REGURGITATION PRESSURE HALF TIME: 809 MS
BACTERIA UR QL AUTO: ABNORMAL /HPF
BASELINE ST DEPRESSION: 0 MM
BASOPHILS # BLD AUTO: 0.04 THOUSANDS/ΜL (ref 0–0.1)
BASOPHILS NFR BLD AUTO: 1 % (ref 0–1)
BILIRUB SERPL-MCNC: 0.46 MG/DL (ref 0.2–1)
BILIRUB UR QL STRIP: NEGATIVE
BUN SERPL-MCNC: 19 MG/DL (ref 5–25)
CALCIUM SERPL-MCNC: 8.9 MG/DL (ref 8.3–10.1)
CHLORIDE SERPL-SCNC: 109 MMOL/L (ref 100–108)
CHOLEST SERPL-MCNC: 167 MG/DL
CLARITY UR: CLEAR
CO2 SERPL-SCNC: 24 MMOL/L (ref 21–32)
COLOR UR: YELLOW
CREAT SERPL-MCNC: 1.03 MG/DL (ref 0.6–1.3)
CRP SERPL HS-MCNC: 1.12 MG/L
E WAVE DECELERATION TIME: 175 MS
EOSINOPHIL # BLD AUTO: 0.15 THOUSAND/ΜL (ref 0–0.61)
EOSINOPHIL NFR BLD AUTO: 3 % (ref 0–6)
ERYTHROCYTE [DISTWIDTH] IN BLOOD BY AUTOMATED COUNT: 15.3 % (ref 11.6–15.1)
EST. AVERAGE GLUCOSE BLD GHB EST-MCNC: 126 MG/DL
FRACTIONAL SHORTENING: 30 % (ref 28–44)
GFR SERPL CREATININE-BSD FRML MDRD: 68 ML/MIN/1.73SQ M
GLUCOSE P FAST SERPL-MCNC: 86 MG/DL (ref 65–99)
GLUCOSE UR STRIP-MCNC: NEGATIVE MG/DL
HBA1C MFR BLD: 6 %
HCT VFR BLD AUTO: 43.4 % (ref 36.5–49.3)
HDLC SERPL-MCNC: 55 MG/DL
HGB BLD-MCNC: 14.3 G/DL (ref 12–17)
HGB UR QL STRIP.AUTO: NEGATIVE
IMM GRANULOCYTES # BLD AUTO: 0.01 THOUSAND/UL (ref 0–0.2)
IMM GRANULOCYTES NFR BLD AUTO: 0 % (ref 0–2)
INTERVENTRICULAR SEPTUM IN DIASTOLE (PARASTERNAL SHORT AXIS VIEW): 0.9 CM
INTERVENTRICULAR SEPTUM: 0.9 CM (ref 0.52–0.98)
KETONES UR STRIP-MCNC: NEGATIVE MG/DL
LAAS-AP2: 22.5 CM2
LAAS-AP4: 18 CM2
LDLC SERPL CALC-MCNC: 98 MG/DL (ref 0–100)
LEFT ATRIUM AREA SYSTOLE SINGLE PLANE A4C: 23.1 CM2
LEFT ATRIUM SIZE: 3.9 CM
LEFT INTERNAL DIMENSION IN SYSTOLE: 3 CM (ref 2.67–4.04)
LEFT VENTRICULAR INTERNAL DIMENSION IN DIASTOLE: 4.3 CM (ref 4.37–6.51)
LEFT VENTRICULAR POSTERIOR WALL IN END DIASTOLE: 0.9 CM (ref 0.51–0.96)
LEFT VENTRICULAR STROKE VOLUME: 48 ML
LEUKOCYTE ESTERASE UR QL STRIP: NEGATIVE
LVSV (TEICH): 48 ML
LYMPHOCYTES # BLD AUTO: 1.12 THOUSANDS/ΜL (ref 0.6–4.47)
LYMPHOCYTES NFR BLD AUTO: 23 % (ref 14–44)
MCH RBC QN AUTO: 31.6 PG (ref 26.8–34.3)
MCHC RBC AUTO-ENTMCNC: 32.9 G/DL (ref 31.4–37.4)
MCV RBC AUTO: 96 FL (ref 82–98)
MONOCYTES # BLD AUTO: 0.63 THOUSAND/ΜL (ref 0.17–1.22)
MONOCYTES NFR BLD AUTO: 13 % (ref 4–12)
MV E'TISSUE VEL-SEP: 8 CM/S
MV PEAK A VEL: 0.6 M/S
MV PEAK E VEL: 66 CM/S
MV STENOSIS PRESSURE HALF TIME: 51 MS
MV VALVE AREA P 1/2 METHOD: 4.31 CM2
NEUTROPHILS # BLD AUTO: 3.01 THOUSANDS/ΜL (ref 1.85–7.62)
NEUTS SEG NFR BLD AUTO: 60 % (ref 43–75)
NITRITE UR QL STRIP: NEGATIVE
NON-SQ EPI CELLS URNS QL MICRO: ABNORMAL /HPF
NRBC BLD AUTO-RTO: 0 /100 WBCS
P AXIS: 49 DEGREES
PA SYSTOLIC PRESSURE: 25 MMHG
PH UR STRIP.AUTO: 5.5 [PH]
PLATELET # BLD AUTO: 210 THOUSANDS/UL (ref 149–390)
PMV BLD AUTO: 8.9 FL (ref 8.9–12.7)
POTASSIUM SERPL-SCNC: 4.2 MMOL/L (ref 3.5–5.3)
PR INTERVAL: 226 MS
PROT SERPL-MCNC: 7.5 G/DL (ref 6.4–8.2)
PROT UR STRIP-MCNC: NEGATIVE MG/DL
PSA SERPL-MCNC: <0.1 NG/ML (ref 0–4)
QRS AXIS: -19 DEGREES
QRSD INTERVAL: 102 MS
QT INTERVAL: 402 MS
QTC INTERVAL: 408 MS
RATE PRESSURE PRODUCT: NORMAL
RBC # BLD AUTO: 4.52 MILLION/UL (ref 3.88–5.62)
RBC #/AREA URNS AUTO: ABNORMAL /HPF
RIGHT ATRIUM AREA SYSTOLE A4C: 16.6 CM2
RIGHT VENTRICLE ID DIMENSION: 3.3 CM
SL CV AV DECELERATION TIME RETROGRADE: 2790 MS
SL CV AV PEAK GRADIENT RETROGRADE: 43 MMHG
SL CV LEFT ATRIUM LENGTH A2C: 5.2 CM
SL CV LV EF: 55
SL CV LV EF: 55
SL CV PED ECHO LEFT VENTRICLE DIASTOLIC VOLUME (MOD BIPLANE) 2D: 84 ML
SL CV PED ECHO LEFT VENTRICLE SYSTOLIC VOLUME (MOD BIPLANE) 2D: 36 ML
SL CV STRESS RECOVERY BP: NORMAL MMHG
SL CV STRESS RECOVERY HR: 76 BPM
SODIUM SERPL-SCNC: 138 MMOL/L (ref 136–145)
SP GR UR STRIP.AUTO: >=1.03 (ref 1–1.03)
STRESS ANGINA INDEX: 0
STRESS BASELINE BP: NORMAL MMHG
STRESS BASELINE HR: 59 BPM
STRESS DUKE TREADMILL SCORE: 6
STRESS O2 SAT REST: 96 %
STRESS PEAK HR: 144 BPM
STRESS PERCENT HR: 102 %
STRESS POST ESTIMATED WORKLOAD: 7 METS
STRESS POST EXERCISE DUR MIN: 6 MIN
STRESS POST O2 SAT PEAK: 93 %
STRESS POST PEAK BP: 160 MMHG
STRESS ST DEPRESSION: 0 MM
STRESS TARGET HR: 144 BPM
T WAVE AXIS: 8 DEGREES
TR MAX PG: 21 MMHG
TR PEAK VELOCITY: 2.3 M/S
TRICUSPID VALVE PEAK REGURGITATION VELOCITY: 2.28 M/S
TRIGL SERPL-MCNC: 72 MG/DL
TSH SERPL DL<=0.05 MIU/L-ACNC: 1.75 UIU/ML (ref 0.36–3.74)
UROBILINOGEN UR QL STRIP.AUTO: 0.2 E.U./DL
VENTRICULAR RATE: 62 BPM
WBC # BLD AUTO: 4.96 THOUSAND/UL (ref 4.31–10.16)
WBC #/AREA URNS AUTO: ABNORMAL /HPF
Z-SCORE OF ASCENDING AORTA: 5.77
Z-SCORE OF INTERVENTRICULAR SEPTUM IN END DIASTOLE: 1.29
Z-SCORE OF LEFT VENTRICULAR DIMENSION IN END DIASTOLE: -2.15
Z-SCORE OF LEFT VENTRICULAR DIMENSION IN END SYSTOLE: -0.72
Z-SCORE OF LEFT VENTRICULAR POSTERIOR WALL IN END DIASTOLE: 1.4

## 2022-03-22 PROCEDURE — 93306 TTE W/DOPPLER COMPLETE: CPT | Performed by: INTERNAL MEDICINE

## 2022-03-22 PROCEDURE — 85025 COMPLETE CBC W/AUTO DIFF WBC: CPT

## 2022-03-22 PROCEDURE — 84443 ASSAY THYROID STIM HORMONE: CPT

## 2022-03-22 PROCEDURE — 93350 STRESS TTE ONLY: CPT

## 2022-03-22 PROCEDURE — 81001 URINALYSIS AUTO W/SCOPE: CPT

## 2022-03-22 PROCEDURE — 83036 HEMOGLOBIN GLYCOSYLATED A1C: CPT

## 2022-03-22 PROCEDURE — 93010 ELECTROCARDIOGRAM REPORT: CPT | Performed by: INTERNAL MEDICINE

## 2022-03-22 PROCEDURE — 86141 C-REACTIVE PROTEIN HS: CPT

## 2022-03-22 PROCEDURE — 84153 ASSAY OF PSA TOTAL: CPT

## 2022-03-22 PROCEDURE — 93005 ELECTROCARDIOGRAM TRACING: CPT

## 2022-03-22 PROCEDURE — 99499EX: Performed by: FAMILY MEDICINE

## 2022-03-22 PROCEDURE — 80061 LIPID PANEL: CPT

## 2022-03-22 PROCEDURE — 93351 STRESS TTE COMPLETE: CPT | Performed by: INTERNAL MEDICINE

## 2022-03-22 PROCEDURE — 93306 TTE W/DOPPLER COMPLETE: CPT

## 2022-03-22 PROCEDURE — 36415 COLL VENOUS BLD VENIPUNCTURE: CPT

## 2022-03-22 PROCEDURE — 80053 COMPREHEN METABOLIC PANEL: CPT

## 2022-03-22 PROCEDURE — 82306 VITAMIN D 25 HYDROXY: CPT

## 2022-03-22 NOTE — ASSESSMENT & PLAN NOTE
You had a complete history and physical today (please see individual diagnoses for more information regarding each specific diagnosis)  I am glad you received your COVID vaccination  You mentioned that you also received this year's flu shot  You also completed Shingrix vaccination and pneumonia vaccination  I do not see any record of a recent tetanus booster  I would consider getting this done (most of the time this can be administered at your local pharmacy)  I would recommend continued follow-up with your gastroenterologist regarding her Marks's esophagus  Would recommend continue healthy diet (limiting sweets)  Also, recommend modifying your current exercise program   Hopefully are fitness expert gave you some good advice today

## 2022-03-22 NOTE — ASSESSMENT & PLAN NOTE
You have history of Barretts esophagus  It appears your doing well with daily pantoprazole 40 mg daily    I would recommend continued follow-up with your gastroenterologist

## 2022-03-22 NOTE — ASSESSMENT & PLAN NOTE
It was noted that you had some ectasia (widening) of your left iliac artery during your 2020 exam   I would recommend repeating an abdominal aortic/iliac ultrasound study in near future    I will place this order for you and follow up with you regarding results

## 2022-03-22 NOTE — ASSESSMENT & PLAN NOTE
You have history of prostate cancer diagnosed in 2008  You had radical prostatectomy at that time  Due to rising PSA values in 2012, you had radiation treatments  Fortunately since that time, your PSA has been undetectable (including today's labs)  I think it is reasonable to continue yearly PSA monitoring

## 2022-03-22 NOTE — ASSESSMENT & PLAN NOTE
Your vascular ultrasound in 2020 showed mild stenosis (less than 50% bilaterally)  You mentioned that you prefer to avoid aspirin and discontinued your statin (rosuvastatin)  I would recommend repeating this ultrasound in the near future    I will place order for you and follow up with results

## 2022-03-22 NOTE — PROGRESS NOTES
Fitness Summary and Recommendations:  Alena Hewitt scored at the 25% on his body composition assessment with a bodyfat % of 27 5%  Alena Hewitt scored in the fair range for flexibility with a sit & reach score of 13 cm  His Muscle Strength/Endurance scores placed him at the 80% (lower body) and 95% (upper body) with a chair stand score of 23 and arm curl score of 30 respectively  Peters Cardiovascular Score of 28 1 placed him at the 25%  Overall, Alena Hewitt would be considered to have an average fitness level with a 51% score  His overall fitness score has increased by 7% from his previous test on 12/03/20  Continued emphasis on regular exercise and sound nutrition practices will enable Alena Hewitt to continue his optimal level of fitness

## 2022-03-22 NOTE — ASSESSMENT & PLAN NOTE
It appears that your restless leg symptoms are under reasonable control with your current dosage of Requip 2 mg a m , 0 5 mg (2 tabs) p m

## 2022-03-22 NOTE — ASSESSMENT & PLAN NOTE
You mentioned that insomnia continues to be an ongoing problem  I think it is reasonable to continue taking melatonin nightly  You mentioned that you have clonazepam at home that you use only on rare occasions as well

## 2022-03-22 NOTE — ASSESSMENT & PLAN NOTE
Your total cholesterol today is reasonable at 167  This is not too bad considering that your no longer taking her cholesterol medication  Recommend continue low-fat diet and exercise    Recheck again in 1 year

## 2022-03-22 NOTE — ASSESSMENT & PLAN NOTE
You have longstanding history of chronic low back pain  You have history of 2 prior surgeries (2010 and 2014)  You mentioned that your symptoms are still present, but manageable  Your currently taking gabapentin 100 mg, 2 capsules daily  Your uncertain if this is providing you any benefit  I would recommend decreasing to 1 tablet daily for 1 week, then discontinue    If your symptoms worsen, consider restarting med

## 2022-03-22 NOTE — PROGRESS NOTES
HEART AND VASCULAR SUMMARY    1  Lipids: Pending from today  2  ECG: Normal Sinus Rhythm    3  Echocardiogram: Normal left ventricular ejection fraction  Mildly dilated left atrium  4  Stress ECHO: Normal Study    5  Coronary Calcium CT: 5213 ( from 2020)    6  The ASCVD Risk score (Claven Venancio et al , 2013) failed to calculate for the following reasons: The 2013 ASCVD risk score is only valid for ages 36 to 78     7  HSCRP:   Lab Results   Component Value Date    HSCRP 2 19 12/03/2020       Impression and Recommendations:    1  Normal resting EKG  2  No evidence of obstructive coronary artery disease based on stress testing  3  Normal left ventricular ejection fraction  4  Recommend heart healthy lifestyle

## 2022-03-22 NOTE — PROGRESS NOTES
[facility] Physical Exam Half Day      Sara Huntley is a [de-identified] y o  male who is presenting for his second [facility] Physical Exam at [address]  Mr Renuka Villarreal is currently semi-retired  He is the [address] [employer]  He currently resides in [facility], but splitting time between here and [address]  His past medical history is significant for a number of medical conditions; prostate cancer diagnosed in 2008, hyperlipidemia, restless leg syndrome, Marks's esophagus, erectile dysfunction, insomnia, mild depression, history of elevated coronary calcium score, bilateral carotid stenosis, chronic low back pain, dilated ascending aorta, stenosis of left iliac artery  Past surgical history significant for 2 prior low back surgeries (2010 in 2014) radical prostatectomy in 2008, and ORIF left hip 2020  Current prescription medications include Requip, Wellbutrin, pantoprazole, gabapentin, and rare use of clonazepam     Mr Minerva Lira is a nonsmoker family history is significant for hypertension and cardiovascular disease  He is a nonsmoker  History of alcohol abuse  He has not had any in greater than 30 years  He drinks approximately 3 D calf coffees per day  He is  with 2 adult daughters  He considers his diet somewhat healthy, but admits to having a "sweet tooth"  He performs Pilates 2 times per week  His major concern today is his lack of physical activity  He he would like some direction to help him improved this  Review of Systems   Constitutional: Negative for chills and fever  HENT: Negative for ear pain and sore throat  Eyes: Negative for pain and visual disturbance  Respiratory: Negative for cough and shortness of breath  Cardiovascular: Negative for chest pain and palpitations  Gastrointestinal: Negative for abdominal pain and vomiting  Genitourinary: Negative for dysuria and hematuria  Musculoskeletal: Negative for arthralgias and back pain  Skin: Negative for color change and rash  Neurological: Negative for seizures and syncope  All other systems reviewed and are negative          Active Ambulatory Problems     Diagnosis Date Noted    Prostate cancer ([address] ) 10/05/2020    Marks's esophagus without dysplasia 10/05/2020    Mild depression ([address] ) 10/05/2020    Chronic bilateral low back pain without sciatica 10/05/2020    Restless leg syndrome 10/05/2020    Hyperlipidemia, unspecified 10/05/2020    Elevated coronary artery calcium score 12/03/2020    Bilateral carotid artery stenosis 12/03/2020    Erectile dysfunction 12/03/2020    Primary insomnia 12/03/2020    Ascending aorta dilatation ([address] ) 12/03/2020    Stenosis of left iliac artery ([address] ) 12/03/2020    Hiatal hernia 12/03/2020    Tendinopathy of right rotator cuff 12/03/2020    Closed fracture of neck of left femur ([facility] [address] ) 12/23/2020    Well adult exam 03/22/2022     Resolved Ambulatory Problems     Diagnosis Date Noted    Preoperative examination 12/23/2020    Fever 12/25/2020     Past Medical History:   Diagnosis Date    Measles 1952    Pericarditis 1980       Past Surgical History:   Procedure Laterality Date    HIP ARTHROPLASTY Left 12/23/2020    Procedure: ANTERIOR APPROACH ARTHROPLASTY HIP TOTAL;  Surgeon: Leon Wade MD;  [facility];  Service: Orthopedics    INGUINAL HERNIA REPAIR Bilateral     1995    KNEE SURGERY      knee arthroscopy    PROSTATE SURGERY  2002    Prostatectomy    SPINE SURGERY      Micro diskectomy November 2010 and lumbar [facility]aminectomy May [facility]       Family History   Problem Relation Age of Onset    Stroke Mother     Hypertension Mother     Prostate cancer Brother     Glaucoma Brother     No Known Problems Daughter     Stroke Maternal Grandmother     Stroke Maternal Aunt     Coronary artery disease Brother     No Known Problems Daughter        Social History     Tobacco Use   Smoking Status Never Smoker   Smokeless Tobacco Never Used         Current Outpatient Medications:     b complex vitamins tablet, Be 5/B6 100/500, Disp: , Rfl:     Blood Pressure Monitoring KIT, Use 2 (two) times a day Omron, upper arm, regular size, Disp: 1 kit, Rfl: 0    buPROPion (WELLBUTRIN XL) 300 mg 24 hr tablet, , Disp: , Rfl:     cholecalciferol (VITAMIN D3) 1,000 units tablet, Take 1 tablet (1,000 Units total) by mouth daily, Disp:  , Rfl:     Chromium 500 MCG TABS, One tablet daily, Disp:  , Rfl: 0    clonazePAM (KlonoPIN) 0 5 mg tablet, Take 0 5 mg by mouth daily at bedtime as needed for anxiety , Disp: , Rfl:     co-enzyme Q-10 50 MG capsule, Take 1 capsule (50 mg total) by mouth daily, Disp:  , Rfl:     Cyanocobalamin (B-12) 1000 MCG TABS, One tab daily, Disp:  , Rfl:     gabapentin (NEURONTIN) 100 mg capsule, TAKE 1 TABLET AT BEDTIME  TITRATE UP TO 3 TABLETS NIGHTLY AS NEEDED , Disp: 90 capsule, Rfl: 3    Magnesium 300 MG CAPS, One tab daily, Disp:  , Rfl: 0    niacin 500 mg tablet, Take 1 tablet (500 mg total) by mouth daily with breakfast, Disp:  , Rfl:     Omega-3 Fatty Acids (Fish Oil) 1200 MG CPDR, bid, Disp:  , Rfl:     pantoprazole (PROTONIX) 40 mg tablet, Take 40 mg by mouth daily, Disp: , Rfl:     Potassium 99 MG TABS, One tab daily, Disp: 330 each, Rfl: 0    rOPINIRole (REQUIP) 0 5 mg tablet, Take 0 5 mg by mouth 3 (three) times a day, Disp: , Rfl:     rOPINIRole (REQUIP) 2 mg tablet, Take 2 mg by mouth 3 (three) times a day, Disp: , Rfl:     tadalafil (CIALIS) 5 MG tablet, Take 5 mg by mouth daily As directed, Disp: , Rfl:     Zinc 50 MG CAPS, Zinc/copper supplement 1 tab daily  , Disp:  , Rfl: 0    Allergies   Allergen Reactions    Other      Hayfever, tree pollen  Objective:     Physical Exam  Constitutional:       Appearance: Normal appearance  HENT:      Head: Normocephalic and atraumatic        Right Ear: Tympanic membrane, ear canal and external ear normal  There is no impacted cerumen  Left Ear: Tympanic membrane, ear canal and external ear normal  There is no impacted cerumen  Nose: Nose normal       Mouth/Throat:      Mouth: Mucous membranes are moist       Pharynx: Oropharynx is clear  No posterior oropharyngeal erythema  Eyes:      Extraocular Movements: Extraocular movements intact  Conjunctiva/sclera: Conjunctivae normal       Pupils: Pupils are equal, round, and reactive to light  Neck:      Vascular: No carotid bruit  Cardiovascular:      Rate and Rhythm: Normal rate and regular rhythm  Pulses: Normal pulses  Heart sounds: Normal heart sounds  No murmur heard  Pulmonary:      Effort: Pulmonary effort is normal       Breath sounds: Normal breath sounds  Abdominal:      General: Abdomen is flat  Bowel sounds are normal  There is no distension  Palpations: Abdomen is soft  There is no mass  Tenderness: There is no abdominal tenderness  Hernia: No hernia is present  Genitourinary:     Comments: KATHI: deferred (status post prostatectomy)  Musculoskeletal:         General: Normal range of motion  Cervical back: Normal range of motion and neck supple  Lymphadenopathy:      Cervical: No cervical adenopathy  Skin:     General: Skin is warm  Capillary Refill: Capillary refill takes less than 2 seconds  Coloration: Skin is not jaundiced  Findings: No rash  Neurological:      General: No focal deficit present  Mental Status: He is alert and oriented to person, place, and time  Cranial Nerves: No cranial nerve deficit  Motor: No weakness  Gait: Gait normal    Psychiatric:         Mood and Affect: Mood normal          Behavior: Behavior normal          Thought Content:  Thought content normal          Judgment: Judgment normal            Vitals:    03/22/22 0955   BP: 122/66   Pulse: 64   Resp: 14   Weight: 75 4 kg (166 lb 3 2 oz)   Height: 5' 7 25" (1 708 m)       Assessment/Plan:    Here are the findings from today's exam:(also see cardiology and dermatology reports)        1  Well adult exam  Assessment & Plan:  You had a complete history and physical today (please see individual diagnoses for more information regarding each specific diagnosis)  I am glad you received your COVID vaccination  You mentioned that you also received this year's flu shot  You also completed Shingrix vaccination and pneumonia vaccination  I do not see any record of a recent tetanus booster  I would consider getting this done (most of the time this can be administered at your local pharmacy)  I would recommend continued follow-up with your gastroenterologist regarding her Marks's esophagus  Would recommend continue healthy diet (limiting sweets)  Also, recommend modifying your current exercise program   Hopefully are fitness expert gave you some good advice today  2  Prostate cancer [facility])  Assessment & Plan:  You have history of prostate cancer diagnosed in 2008  You had radical prostatectomy at that time  Due to rising PSA values in 2012, you had radiation treatments  Fortunately since that time, your PSA has been undetectable (including today's labs)  I think it is reasonable to continue yearly PSA monitoring  3  Bilateral carotid artery stenosis  Assessment & Plan: Your vascular ultrasound in 2020 showed mild stenosis (less than 50% bilaterally)  You mentioned that you prefer to avoid aspirin and discontinued your statin (rosuvastatin)  I would recommend repeating this ultrasound in the near future  I will place order for you and follow up with results    Orders:  -     VAS carotid complete study; Future; Expected date: 03/22/2022    4  Stenosis of left iliac artery [facility])  Assessment & Plan:   It was noted that you had some ectasia (widening) of your left iliac artery during your 2020 exam   I would recommend repeating an abdominal aortic/iliac ultrasound study in near future  I will place this order for you and follow up with you regarding results    Orders:  -     VAS abdominal aorta/iliacs; complete study; Future; Expected date: 03/22/2022    5  Restless leg syndrome  Assessment & Plan:  It appears that your restless leg symptoms are under reasonable control with your current dosage of Requip 2 mg a m , 0 5 mg (2 tabs) p m       6  Mixed hyperlipidemia  Assessment & Plan: Your total cholesterol today is reasonable at 167  This is not too bad considering that your no longer taking her cholesterol medication  Recommend continue low-fat diet and exercise  Recheck again in 1 year      7  Marks's esophagus without dysplasia  Assessment & Plan:  You have history of Barretts esophagus  It appears your doing well with daily pantoprazole 40 mg daily  I would recommend continued follow-up with your gastroenterologist       8  Chronic bilateral low back pain without sciatica  Assessment & Plan:  You have longstanding history of chronic low back pain  You have history of 2 prior surgeries (2010 and 2014)  You mentioned that your symptoms are still present, but manageable  Your currently taking gabapentin 100 mg, 2 capsules daily  Your uncertain if this is providing you any benefit  I would recommend decreasing to 1 tablet daily for 1 week, then discontinue  If your symptoms worsen, consider restarting med      9  Primary insomnia  Assessment & Plan: You mentioned that insomnia continues to be an ongoing problem  I think it is reasonable to continue taking melatonin nightly  You mentioned that you have clonazepam at home that you use only on rare occasions as well  10  Mild depression ([facility] [address] )  Assessment & Plan:  It appears your doing well on daily Wellbutrin  mg         11  Erectile dysfunction, unspecified erectile dysfunction type  Assessment & Plan:  It appears your doing well with daily use of Cialis 5 mg daily      Awilda Gee,     Thank you for choosing [facility]  It was a pleasure meeting and getting to know you today  If you have any questions regarding today's exam, feel free to contact me  We hope to see you again in the future  Jesusita Aguirre ([address] Physician)  [phone] (cell)  [email or web address]

## 2022-03-23 LAB
CHEST PAIN STATEMENT: NORMAL
MAX DIASTOLIC BP: 84 MMHG
MAX HEART RATE: 144 BPM
MAX PREDICTED HEART RATE: 140 BPM
MAX. SYSTOLIC BP: 160 MMHG
PROTOCOL NAME: NORMAL
REASON FOR TERMINATION: NORMAL
TARGET HR FORMULA: NORMAL
TEST INDICATION: NORMAL
TIME IN EXERCISE PHASE: NORMAL

## 2022-04-01 ENCOUNTER — APPOINTMENT (OUTPATIENT)
Dept: RADIOLOGY | Facility: AMBULARY SURGERY CENTER | Age: 82
End: 2022-04-01
Attending: ORTHOPAEDIC SURGERY
Payer: MEDICARE

## 2022-04-01 ENCOUNTER — OFFICE VISIT (OUTPATIENT)
Dept: OBGYN CLINIC | Facility: CLINIC | Age: 82
End: 2022-04-01
Payer: MEDICARE

## 2022-04-01 VITALS
DIASTOLIC BLOOD PRESSURE: 69 MMHG | SYSTOLIC BLOOD PRESSURE: 122 MMHG | WEIGHT: 170 LBS | BODY MASS INDEX: 26.68 KG/M2 | HEIGHT: 67 IN | HEART RATE: 64 BPM

## 2022-04-01 DIAGNOSIS — Z48.89 AFTERCARE FOLLOWING SURGERY: Primary | ICD-10-CM

## 2022-04-01 DIAGNOSIS — Z48.89 AFTERCARE FOLLOWING SURGERY: ICD-10-CM

## 2022-04-01 PROCEDURE — 99213 OFFICE O/P EST LOW 20 MIN: CPT | Performed by: ORTHOPAEDIC SURGERY

## 2022-04-01 PROCEDURE — 73502 X-RAY EXAM HIP UNI 2-3 VIEWS: CPT

## 2022-04-01 NOTE — PROGRESS NOTES
Orthopedics          Willie Gabriel [de-identified] y o  male MRN: [id number]      Chief Complaint:   Status post 15 months left anterior total hip arthroplasty due to femoral neck fracture    HPI:   [de-identified] y o male patient states he is doing well status post left anterior total hip arthroplasty to femoral neck fracture  Patient is back to all activities denies any pain issues in his left lower extremity denies any changes numbness tingling left lower extremity                  Review Of Systems:   · Skin: Normal  · Neuro: See HPI  · Musculoskeletal: See HPI  · All other systems reviewed and are negative    Past Medical History:   Past Medical History:   Diagnosis Date    Measles 1952    Pericarditis 1980       Past Surgical History:   Past Surgical History:   Procedure Laterality Date    HIP ARTHROPLASTY Left 12/23/2020    Procedure: ANTERIOR APPROACH ARTHROPLASTY HIP TOTAL;  Surgeon: Lola Nielsen MD;  [facility];  Service: Orthopedics    INGUINAL HERNIA REPAIR Bilateral     [address]      knee arthroscopy    PROSTATE SURGERY  2002    Prostatectomy    SPINE SURGERY      Micro diskectomy November 2010 and lumbar [facility]ctomy May [facility] at [facility]       Family History:  Family history reviewed and non-contributory  Family History   Problem Relation Age of Onset    Stroke Mother     Hypertension Mother     Prostate cancer Brother     Glaucoma Brother     No Known Problems Daughter     Stroke Maternal Grandmother     Stroke Maternal Aunt     Coronary artery disease Brother     No Known Problems Daughter          Social History:  Social History     Socioeconomic History    Marital status: /Civil Union     Spouse name: None    Number of children: None    Years of education: None    Highest education level: None   Occupational History    None   Tobacco Use    Smoking status: Never Smoker    Smokeless tobacco: Never Used   Vaping Use    Vaping Use: Never used Substance and Sexual Activity    Alcohol use: Not Currently     Comment: Recovering alcoholic  Quit drinking 1992   Drug use: Never    Sexual activity: Not Currently   Other Topics Concern    None   Social History Narrative    None     Social Determinants of Health     Financial Resource Strain: Not on file   Food Insecurity: Not on file   Transportation Needs: Not on file   Physical Activity: Not on file   Stress: Not on file   Social Connections: Not on file   Intimate Partner Violence: Not on file   Housing Stability: Not on file       Allergies: Allergies   Allergen Reactions    Other      Hayfever, tree pollen  Labs:  0   Lab Value Date/Time    HCT 43 4 03/22/2022 0836    HCT 39 5 12/26/2020 0605    HCT 40 4 12/25/2020 0612    HGB 14 3 03/22/2022 0836    HGB 12 9 12/26/2020 0605    HGB 13 2 12/25/2020 0612    INR 1 07 12/23/2020 0306    WBC 4 96 03/22/2022 0836    WBC 8 04 12/26/2020 0605    WBC 9 82 12/25/2020 0612    ESR 24 (H) 12/23/2020 0405       Meds:    Current Outpatient Medications:     b complex vitamins tablet, Be 5/B6 100/500, Disp: , Rfl:     Blood Pressure Monitoring KIT, Use 2 (two) times a day Omron, upper arm, regular size, Disp: 1 kit, Rfl: 0    buPROPion (WELLBUTRIN XL) 300 mg 24 hr tablet, , Disp: , Rfl:     cholecalciferol (VITAMIN D3) 1,000 units tablet, Take 1 tablet (1,000 Units total) by mouth daily, Disp:  , Rfl:     Chromium 500 MCG TABS, One tablet daily, Disp:  , Rfl: 0    clonazePAM (KlonoPIN) 0 5 mg tablet, Take 0 5 mg by mouth daily at bedtime as needed for anxiety , Disp: , Rfl:     co-enzyme Q-10 50 MG capsule, Take 1 capsule (50 mg total) by mouth daily, Disp:  , Rfl:     Cyanocobalamin (B-12) 1000 MCG TABS, One tab daily, Disp:  , Rfl:     gabapentin (NEURONTIN) 100 mg capsule, TAKE 1 TABLET AT BEDTIME   TITRATE UP TO 3 TABLETS NIGHTLY AS NEEDED , Disp: 90 capsule, Rfl: 3    Magnesium 300 MG CAPS, One tab daily, Disp:  , Rfl: 0    niacin 500 mg tablet, Take 1 tablet (500 mg total) by mouth daily with breakfast, Disp:  , Rfl:     Omega-3 Fatty Acids (Fish Oil) 1200 MG CPDR, bid, Disp:  , Rfl:     pantoprazole (PROTONIX) 40 mg tablet, Take 40 mg by mouth daily, Disp: , Rfl:     Potassium 99 MG TABS, One tab daily, Disp: 330 each, Rfl: 0    rOPINIRole (REQUIP) 0 5 mg tablet, Take 0 5 mg by mouth 3 (three) times a day, Disp: , Rfl:     rOPINIRole (REQUIP) 2 mg tablet, Take 2 mg by mouth 3 (three) times a day, Disp: , Rfl:     tadalafil (CIALIS) 5 MG tablet, Take 5 mg by mouth daily As directed, Disp: , Rfl:     Zinc 50 MG CAPS, Zinc/copper supplement 1 tab daily  , Disp:  , Rfl: 0      Physical Exam:   Vitals:    04/01/22 1042   BP: 122/69   Pulse: 64       General Appearance:    Alert, cooperative, no distress, appears stated age   Head:    Normocephalic, without obvious abnormality, atraumatic   Eyes:    conjunctiva/corneas clear, both eyes        Nose:   Nares normal, septum midline, no drainage    Throat:   Lips normal; teeth and gums normal   Neck:    symmetrical, trachea midline, ;     thyroid:  no enlargement/   Back:     Symmetric, no curvature, ROM normal   Lungs:   No audible wheezing or labored breathing   Chest Wall:    No tenderness or deformity    Heart:    Regular rate and rhythm               Pulses:   2+ and symmetric all extremities   Skin:   Skin color, texture, turgor normal, no rashes or lesions   Neurologic:   normal strength, sensation and reflexes     throughout       Musculoskeletal: left lower extremity   Examination of patient's left lower extremity well-healed anterior incision active hip flexion greater than 90 degree no pain with internal-external rotation hip flexion adduction abduction strength 5/5 active straight leg raise ankle dorsi plantar flexion strength 5/5 sensation intact distal pulses present    Radiology:   I personally reviewed the films    X-rays left hip reveal well located left total hip arthroplasty without evidence of loosening    _*_*_*_*_*_*_*_*_*_*_*_*_*_*_*_*_*_*_*_*_*_*_*_*_*_*_*_*_*_*_*_*_*_*_*_*_*_*_*_*_*    Assessment:  81 y o male status post 15 months left anterior total hip arthroplasty due to femoral neck fracture doing well    Plan:   · Weight bearing as tolerated  left lower extremity  · Lifetime dental antibiotic prophylaxis recommended   · Continue home range of motion stretching strengthening exercise   · Activities as tolerated  · Follow-up an as-needed basis regarding patient's left hip

## 2022-04-20 ENCOUNTER — EMERGENCY (EMERGENCY)
Facility: HOSPITAL | Age: 82
LOS: 1 days | Discharge: HOME | End: 2022-04-20
Attending: EMERGENCY MEDICINE | Admitting: EMERGENCY MEDICINE
Payer: MEDICARE

## 2022-04-20 VITALS
DIASTOLIC BLOOD PRESSURE: 93 MMHG | WEIGHT: 169.98 LBS | SYSTOLIC BLOOD PRESSURE: 182 MMHG | RESPIRATION RATE: 18 BRPM | TEMPERATURE: 99 F | HEART RATE: 99 BPM | OXYGEN SATURATION: 94 %

## 2022-04-20 PROCEDURE — 99284 EMERGENCY DEPT VISIT MOD MDM: CPT

## 2022-04-20 NOTE — ED ADULT TRIAGE NOTE - CHIEF COMPLAINT QUOTE
Pt walked in c/o urinary catheter complication since this afternoon. Hx of bladder cancer. Pt states had bueno catheter placed a week ago, has had decr urinary output. +hematuria in leg bag. Pt states has been having pelvic pain. Denies n/v/d.

## 2022-04-21 VITALS
SYSTOLIC BLOOD PRESSURE: 152 MMHG | HEART RATE: 81 BPM | RESPIRATION RATE: 14 BRPM | OXYGEN SATURATION: 98 % | TEMPERATURE: 99 F | DIASTOLIC BLOOD PRESSURE: 79 MMHG

## 2022-04-21 LAB
ALBUMIN SERPL ELPH-MCNC: 3.3 G/DL — LOW (ref 3.4–5)
ALP SERPL-CCNC: 75 U/L — SIGNIFICANT CHANGE UP (ref 40–120)
ALT FLD-CCNC: 34 U/L — SIGNIFICANT CHANGE UP (ref 12–42)
ANION GAP SERPL CALC-SCNC: 10 MMOL/L — SIGNIFICANT CHANGE UP (ref 9–16)
APPEARANCE UR: ABNORMAL
APTT BLD: 28.7 SEC — SIGNIFICANT CHANGE UP (ref 27.5–35.5)
AST SERPL-CCNC: 31 U/L — SIGNIFICANT CHANGE UP (ref 15–37)
BACTERIA # UR AUTO: PRESENT /HPF
BASOPHILS # BLD AUTO: 0.06 K/UL — SIGNIFICANT CHANGE UP (ref 0–0.2)
BASOPHILS NFR BLD AUTO: 0.6 % — SIGNIFICANT CHANGE UP (ref 0–2)
BILIRUB SERPL-MCNC: 0.5 MG/DL — SIGNIFICANT CHANGE UP (ref 0.2–1.2)
BILIRUB UR-MCNC: ABNORMAL
BUN SERPL-MCNC: 23 MG/DL — SIGNIFICANT CHANGE UP (ref 7–23)
CALCIUM SERPL-MCNC: 8.7 MG/DL — SIGNIFICANT CHANGE UP (ref 8.5–10.5)
CHLORIDE SERPL-SCNC: 103 MMOL/L — SIGNIFICANT CHANGE UP (ref 96–108)
CO2 SERPL-SCNC: 23 MMOL/L — SIGNIFICANT CHANGE UP (ref 22–31)
COLOR SPEC: ABNORMAL
COMMENT - URINE: SIGNIFICANT CHANGE UP
CREAT SERPL-MCNC: 1.07 MG/DL — SIGNIFICANT CHANGE UP (ref 0.5–1.3)
DIFF PNL FLD: ABNORMAL
EGFR: 70 ML/MIN/1.73M2 — SIGNIFICANT CHANGE UP
EOSINOPHIL # BLD AUTO: 0.14 K/UL — SIGNIFICANT CHANGE UP (ref 0–0.5)
EOSINOPHIL NFR BLD AUTO: 1.3 % — SIGNIFICANT CHANGE UP (ref 0–6)
EPI CELLS # UR: ABNORMAL /HPF (ref 0–5)
GLUCOSE SERPL-MCNC: 124 MG/DL — HIGH (ref 70–99)
GLUCOSE UR QL: NEGATIVE — SIGNIFICANT CHANGE UP
HCT VFR BLD CALC: 39.1 % — SIGNIFICANT CHANGE UP (ref 39–50)
HGB BLD-MCNC: 13 G/DL — SIGNIFICANT CHANGE UP (ref 13–17)
IMM GRANULOCYTES NFR BLD AUTO: 0.3 % — SIGNIFICANT CHANGE UP (ref 0–1.5)
INR BLD: 1.18 — HIGH (ref 0.88–1.16)
KETONES UR-MCNC: ABNORMAL MG/DL
LEUKOCYTE ESTERASE UR-ACNC: ABNORMAL
LYMPHOCYTES # BLD AUTO: 0.75 K/UL — LOW (ref 1–3.3)
LYMPHOCYTES # BLD AUTO: 6.9 % — LOW (ref 13–44)
MCHC RBC-ENTMCNC: 31.6 PG — SIGNIFICANT CHANGE UP (ref 27–34)
MCHC RBC-ENTMCNC: 33.2 GM/DL — SIGNIFICANT CHANGE UP (ref 32–36)
MCV RBC AUTO: 95.1 FL — SIGNIFICANT CHANGE UP (ref 80–100)
MONOCYTES # BLD AUTO: 0.69 K/UL — SIGNIFICANT CHANGE UP (ref 0–0.9)
MONOCYTES NFR BLD AUTO: 6.4 % — SIGNIFICANT CHANGE UP (ref 2–14)
NEUTROPHILS # BLD AUTO: 9.18 K/UL — HIGH (ref 1.8–7.4)
NEUTROPHILS NFR BLD AUTO: 84.5 % — HIGH (ref 43–77)
NITRITE UR-MCNC: POSITIVE
NRBC # BLD: 0 /100 WBCS — SIGNIFICANT CHANGE UP (ref 0–0)
PH UR: 7 — SIGNIFICANT CHANGE UP (ref 5–8)
PLATELET # BLD AUTO: 232 K/UL — SIGNIFICANT CHANGE UP (ref 150–400)
POTASSIUM SERPL-MCNC: 4.3 MMOL/L — SIGNIFICANT CHANGE UP (ref 3.5–5.3)
POTASSIUM SERPL-SCNC: 4.3 MMOL/L — SIGNIFICANT CHANGE UP (ref 3.5–5.3)
PROT SERPL-MCNC: 7 G/DL — SIGNIFICANT CHANGE UP (ref 6.4–8.2)
PROT UR-MCNC: 100 MG/DL
PROTHROM AB SERPL-ACNC: 13.7 SEC — HIGH (ref 10.5–13.4)
RBC # BLD: 4.11 M/UL — LOW (ref 4.2–5.8)
RBC # FLD: 14.7 % — HIGH (ref 10.3–14.5)
RBC CASTS # UR COMP ASSIST: ABNORMAL /HPF
SODIUM SERPL-SCNC: 136 MMOL/L — SIGNIFICANT CHANGE UP (ref 132–145)
SP GR SPEC: 1.02 — SIGNIFICANT CHANGE UP (ref 1–1.03)
UROBILINOGEN FLD QL: 1 E.U./DL — SIGNIFICANT CHANGE UP
WBC # BLD: 10.85 K/UL — HIGH (ref 3.8–10.5)
WBC # FLD AUTO: 10.85 K/UL — HIGH (ref 3.8–10.5)
WBC UR QL: ABNORMAL /HPF

## 2022-04-21 RX ORDER — TAMSULOSIN HYDROCHLORIDE 0.4 MG/1
0.4 CAPSULE ORAL ONCE
Refills: 0 | Status: COMPLETED | OUTPATIENT
Start: 2022-04-21 | End: 2022-04-21

## 2022-04-21 RX ORDER — PHENAZOPYRIDINE HCL 100 MG
200 TABLET ORAL ONCE
Refills: 0 | Status: COMPLETED | OUTPATIENT
Start: 2022-04-21 | End: 2022-04-21

## 2022-04-21 RX ORDER — TAMSULOSIN HYDROCHLORIDE 0.4 MG/1
1 CAPSULE ORAL
Qty: 30 | Refills: 0
Start: 2022-04-21 | End: 2022-05-20

## 2022-04-21 RX ORDER — MOXIFLOXACIN HYDROCHLORIDE TABLETS, 400 MG 400 MG/1
1 TABLET, FILM COATED ORAL
Qty: 20 | Refills: 0
Start: 2022-04-21 | End: 2022-04-30

## 2022-04-21 RX ORDER — CIPROFLOXACIN LACTATE 400MG/40ML
500 VIAL (ML) INTRAVENOUS ONCE
Refills: 0 | Status: COMPLETED | OUTPATIENT
Start: 2022-04-21 | End: 2022-04-21

## 2022-04-21 RX ORDER — PHENAZOPYRIDINE HCL 100 MG
1 TABLET ORAL
Qty: 6 | Refills: 0
Start: 2022-04-21 | End: 2022-04-22

## 2022-04-21 RX ADMIN — Medication 200 MILLIGRAM(S): at 01:14

## 2022-04-21 RX ADMIN — Medication 500 MILLIGRAM(S): at 01:14

## 2022-04-21 RX ADMIN — TAMSULOSIN HYDROCHLORIDE 0.4 MILLIGRAM(S): 0.4 CAPSULE ORAL at 01:14

## 2022-04-21 NOTE — ED PROVIDER NOTE - CLINICAL SUMMARY MEDICAL DECISION MAKING FREE TEXT BOX
patient with hx of newly diagnosed bladder ca, vss, afebrile, nontender abd. ua and u cx sent, labs, start cipro, flomax and pyridium, given strict return instructions, will call his urologist at [facility] in am, given labs for pre op testing. not on anticoagulation, stable for dc.

## 2022-04-21 NOTE — ED PROVIDER NOTE - OBJECTIVE STATEMENT
80 yo M, hx bladder cancer, newly diagnosed, s/p biopsy, and cystoscopy 3 weeks ago, complicated with urinary retention, subsequent bueno placement, x 1, with continued retention at void trial, followed by second bueno placement and change one week ago. patient presents with urethral discomfort, scant discharge, and hematuria, and urinary retention. patient denies associated N/V/D, denies fver, chills, denies abdominal or pelvic pain after bueno flushing. denies rash. denies recent abx or hospitalization. patient going to preop testing tomorrow and monday has elective lap surgery for bladder ca. denies trauma.

## 2022-04-21 NOTE — ED ADULT NURSE NOTE - NSIMPLEMENTINTERV_GEN_ALL_ED
Implemented All Universal Safety Interventions:  [place] to call system. Call bell, personal items and telephone within reach. Instruct patient to call for assistance. Room bathroom lighting operational. Non-slip footwear when patient is off stretcher. Physically safe environment: no spills, clutter or unnecessary equipment. Stretcher in lowest position, wheels locked, appropriate side rails in place.

## 2022-04-21 NOTE — ED PROVIDER NOTE - PATIENT PORTAL LINK FT
You can access the FollowMyHealth Patient Portal offered by [facility] by registering at the following website: [email or web address]. By joining [facility]’s FollowMyHealth portal, you will also be able to view your health information using other applications (apps) compatible with our system.

## 2022-04-21 NOTE — ED PROVIDER NOTE - CARE PLAN
1 Principal Discharge DX:	Acute hemorrhagic cystitis  Secondary Diagnosis:	UTI (urinary tract infection) due to urinary indwelling Car catheter

## 2022-04-22 DIAGNOSIS — C67.9 MALIGNANT NEOPLASM OF BLADDER, UNSPECIFIED: ICD-10-CM

## 2022-04-22 DIAGNOSIS — R33.9 RETENTION OF URINE, UNSPECIFIED: ICD-10-CM

## 2022-04-22 DIAGNOSIS — N30.01 ACUTE CYSTITIS WITH HEMATURIA: ICD-10-CM

## 2022-04-22 DIAGNOSIS — T83.011A BREAKDOWN (MECHANICAL) OF INDWELLING URETHRAL CATHETER, INITIAL ENCOUNTER: ICD-10-CM
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
## 2022-04-23 NOTE — ED POST DISCHARGE NOTE - RESULT SUMMARY
UCx - gram neg rods, awaiting sensitivities and bacteria ID, treated w cipro UCx - gram neg rods, awaiting sensitivities and bacteria ID, treated w cipro; sensitivities back and treated appropriately.

## 2022-05-27 ENCOUNTER — HOSPITAL ENCOUNTER (EMERGENCY)
Facility: HOSPITAL | Age: 82
Discharge: HOME | End: 2022-05-27
Attending: EMERGENCY MEDICINE
Payer: MEDICARE

## 2022-05-27 VITALS
OXYGEN SATURATION: 94 % | TEMPERATURE: 98.1 F | HEART RATE: 92 BPM | RESPIRATION RATE: 20 BRPM | SYSTOLIC BLOOD PRESSURE: 173 MMHG | DIASTOLIC BLOOD PRESSURE: 105 MMHG

## 2022-05-27 DIAGNOSIS — R33.9 URINARY RETENTION: Primary | ICD-10-CM

## 2022-05-27 LAB
ANION GAP SERPL CALCULATED.3IONS-SCNC: 5 MMOL/L (ref 4–13)
BACTERIA UR QL AUTO: ABNORMAL /HPF
BASOPHILS # BLD AUTO: 0.07 THOUSANDS/ΜL (ref 0–0.1)
BASOPHILS NFR BLD AUTO: 1 % (ref 0–1)
BILIRUB UR QL STRIP: NEGATIVE
BUN SERPL-MCNC: 23 MG/DL (ref 5–25)
CALCIUM SERPL-MCNC: 9.1 MG/DL (ref 8.3–10.1)
CHLORIDE SERPL-SCNC: 108 MMOL/L (ref 100–108)
CLARITY UR: CLEAR
CLARITY, POC: CLEAR
CO2 SERPL-SCNC: 25 MMOL/L (ref 21–32)
COLOR UR: YELLOW
COLOR, POC: YELLOW
CREAT SERPL-MCNC: 1.26 MG/DL (ref 0.6–1.3)
EOSINOPHIL # BLD AUTO: 0.13 THOUSAND/ΜL (ref 0–0.61)
EOSINOPHIL NFR BLD AUTO: 2 % (ref 0–6)
ERYTHROCYTE [DISTWIDTH] IN BLOOD BY AUTOMATED COUNT: 14.6 % (ref 11.6–15.1)
GFR SERPL CREATININE-BSD FRML MDRD: 53 ML/MIN/1.73SQ M
GLUCOSE SERPL-MCNC: 103 MG/DL (ref 65–140)
GLUCOSE UR STRIP-MCNC: NEGATIVE MG/DL
HCT VFR BLD AUTO: 38.8 % (ref 36.5–49.3)
HGB BLD-MCNC: 14 G/DL (ref 12–17)
HGB UR QL STRIP.AUTO: ABNORMAL
IMM GRANULOCYTES # BLD AUTO: 0.02 THOUSAND/UL (ref 0–0.2)
IMM GRANULOCYTES NFR BLD AUTO: 0 % (ref 0–2)
KETONES UR STRIP-MCNC: NEGATIVE MG/DL
LEUKOCYTE ESTERASE UR QL STRIP: ABNORMAL
LYMPHOCYTES # BLD AUTO: 1.28 THOUSANDS/ΜL (ref 0.6–4.47)
LYMPHOCYTES NFR BLD AUTO: 21 % (ref 14–44)
MCH RBC QN AUTO: 34.4 PG (ref 26.8–34.3)
MCHC RBC AUTO-ENTMCNC: 36.1 G/DL (ref 31.4–37.4)
MCV RBC AUTO: 95 FL (ref 82–98)
MONOCYTES # BLD AUTO: 0.56 THOUSAND/ΜL (ref 0.17–1.22)
MONOCYTES NFR BLD AUTO: 9 % (ref 4–12)
NEUTROPHILS # BLD AUTO: 4.09 THOUSANDS/ΜL (ref 1.85–7.62)
NEUTS SEG NFR BLD AUTO: 67 % (ref 43–75)
NITRITE UR QL STRIP: NEGATIVE
NON-SQ EPI CELLS URNS QL MICRO: ABNORMAL /HPF
NRBC BLD AUTO-RTO: 0 /100 WBCS
PH UR STRIP.AUTO: 6 [PH] (ref 4.5–8)
PLATELET # BLD AUTO: 238 THOUSANDS/UL (ref 149–390)
PMV BLD AUTO: 9 FL (ref 8.9–12.7)
POTASSIUM SERPL-SCNC: 4.5 MMOL/L (ref 3.5–5.3)
PROT UR STRIP-MCNC: ABNORMAL MG/DL
RBC # BLD AUTO: 4.07 MILLION/UL (ref 3.88–5.62)
RBC #/AREA URNS AUTO: ABNORMAL /HPF
SODIUM SERPL-SCNC: 138 MMOL/L (ref 136–145)
SP GR UR STRIP.AUTO: 1.02 (ref 1–1.03)
UROBILINOGEN UR QL STRIP.AUTO: 0.2 E.U./DL
WBC # BLD AUTO: 6.15 THOUSAND/UL (ref 4.31–10.16)
WBC #/AREA URNS AUTO: ABNORMAL /HPF

## 2022-05-27 PROCEDURE — 99284 EMERGENCY DEPT VISIT MOD MDM: CPT | Performed by: EMERGENCY MEDICINE

## 2022-05-27 PROCEDURE — 80048 BASIC METABOLIC PNL TOTAL CA: CPT | Performed by: EMERGENCY MEDICINE

## 2022-05-27 PROCEDURE — 85025 COMPLETE CBC W/AUTO DIFF WBC: CPT | Performed by: EMERGENCY MEDICINE

## 2022-05-27 PROCEDURE — 36415 COLL VENOUS BLD VENIPUNCTURE: CPT | Performed by: EMERGENCY MEDICINE

## 2022-05-27 PROCEDURE — 81001 URINALYSIS AUTO W/SCOPE: CPT

## 2022-05-27 PROCEDURE — 99283 EMERGENCY DEPT VISIT LOW MDM: CPT

## 2022-05-27 RX ORDER — PHENAZOPYRIDINE HYDROCHLORIDE 200 MG/1
200 TABLET, FILM COATED ORAL 3 TIMES DAILY
Qty: 6 TABLET | Refills: 0 | Status: SHIPPED | OUTPATIENT
Start: 2022-05-27

## 2022-05-27 RX ORDER — TAMSULOSIN HYDROCHLORIDE 0.4 MG/1
0.4 CAPSULE ORAL
Qty: 7 CAPSULE | Refills: 0 | Status: SHIPPED | OUTPATIENT
Start: 2022-05-27 | End: 2022-06-03

## 2022-05-28 NOTE — ED PROVIDER NOTES
History  Chief Complaint   Patient presents with    Urinary Retention     Pt reports "not being able to urinate", pt had catheter removed on Tuesday, pt has history of bladder cancer, pt is currently receiving BCG for bladder ca     Ramona Hartmann is an [de-identified]y o  year old male with PMHx significant for active bladder cancer currently receiving bcg, urinary retention with recent Guthrie catheter dependence, who presents to the ED today with difficulty with urination for several hours today  Currently having dysuria  Currently having lower abdominal discomfort like his bladder is distended  No hematuria or foul-smelling  This AM had no difficulties with urination  The patient denies fevers, chills, headaches, lightheadedness or syncope, nausea, vomiting, chest pain, shortness of breath, changes in usual bowel movements, new back pain, new pain anywhere else in body  ROS otherwise negative  History provided by:  Medical records and patient   used: No        Prior to Admission Medications   Prescriptions Last Dose Informant Patient Reported? Taking? Blood Pressure Monitoring KIT   No No   Sig: Use 2 (two) times a day Omron, upper arm, regular size   Chromium 500 MCG TABS  Self No No   Sig: One tablet daily   Cyanocobalamin (B-12) 1000 MCG TABS  Self No No   Sig: One tab daily   Magnesium 300 MG CAPS  Self No No   Sig: One tab daily   Omega-3 Fatty Acids (Fish Oil) 1200 MG CPDR  Self No No   Sig: bid   Potassium 99 MG TABS  Self No No   Sig: One tab daily   Zinc 50 MG CAPS  Self No No   Sig: Zinc/copper supplement 1 tab daily     b complex vitamins tablet  Self No No   Sig: Be 5/B6 100/500   buPROPion (WELLBUTRIN XL) 300 mg 24 hr tablet  Self Yes No   cholecalciferol (VITAMIN D3) 1,000 units tablet  Self No No   Sig: Take 1 tablet (1,000 Units total) by mouth daily   clonazePAM (KlonoPIN) 0 5 mg tablet  Self Yes No   Sig: Take 0 5 mg by mouth daily at bedtime as needed for anxiety co-enzyme Q-10 50 MG capsule  Self No No   Sig: Take 1 capsule (50 mg total) by mouth daily   gabapentin (NEURONTIN) 100 mg capsule   No No   Sig: TAKE 1 TABLET AT BEDTIME  TITRATE UP TO 3 TABLETS NIGHTLY AS NEEDED  niacin 500 mg tablet  Self No No   Sig: Take 1 tablet (500 mg total) by mouth daily with breakfast   pantoprazole (PROTONIX) 40 mg tablet  Self Yes No   Sig: Take 40 mg by mouth daily   rOPINIRole (REQUIP) 0 5 mg tablet   Yes No   Sig: Take 0 5 mg by mouth 3 (three) times a day   rOPINIRole (REQUIP) 2 mg tablet   Yes No   Sig: Take 2 mg by mouth 3 (three) times a day   tadalafil (CIALIS) 5 MG tablet  Self Yes No   Sig: Take 5 mg by mouth daily As directed      Facility-Administered Medications: None       Past Medical History:   Diagnosis Date    Measles 1952    Pericarditis 1980       Past Surgical History:   Procedure Laterality Date    HIP ARTHROPLASTY Left 12/23/2020    Procedure: ANTERIOR APPROACH ARTHROPLASTY HIP TOTAL;  Surgeon: Florian Silva MD;  Location: [facility];  Service: Orthopedics    INGUINAL HERNIA REPAIR Bilateral     1995    KNEE SURGERY      knee arthroscopy    PROSTATE SURGERY  2002    Prostatectomy    SPINE SURGERY      Micro diskectomy November 2010 and lumbar laminectomy May 2014 [facility]       Family History   Problem Relation Age of Onset    Stroke Mother     Hypertension Mother     Prostate cancer Brother     Glaucoma Brother     No Known Problems Daughter     Stroke Maternal Grandmother     Stroke Maternal Aunt     Coronary artery disease Brother     No Known Problems Daughter      I have reviewed and agree with the history as documented      E-Cigarette/Vaping    E-Cigarette Use Never User      E-Cigarette/Vaping Substances    Nicotine No     THC No     CBD No     Flavoring No     Other No     Unknown No      Social History     Tobacco Use    Smoking status: Never Smoker    Smokeless tobacco: Never Used   Vaping Use    Vaping Use: Never used   Substance Use Topics    Alcohol use: Not Currently     Comment: Recovering alcoholic  Quit drinking 1992   Drug use: Never        Review of Systems   Reason unable to perform ROS: please see above for ROS  All other ROS negative  Physical Exam  ED Triage Vitals [05/27/22 2140]   Temperature Pulse Respirations Blood Pressure SpO2   98 1 °F (36 7 °C) 92 20 (!) 173/105 94 %      Temp Source Heart Rate Source Patient Position - Orthostatic VS BP Location FiO2 (%)   Oral Monitor Sitting Right arm --      Pain Score       --             Orthostatic Vital Signs  Vitals:    05/27/22 2140   BP: (!) 173/105   Pulse: 92   Patient Position - Orthostatic VS: Sitting       Physical Exam  Vitals and nursing note reviewed  Constitutional:       General: He is not in acute distress  Appearance: He is well-developed  He is not diaphoretic  HENT:      Head: Normocephalic and atraumatic  Eyes:      General: No scleral icterus  Conjunctiva/sclera: Conjunctivae normal    Neck:      Vascular: No JVD  Trachea: No tracheal deviation  Cardiovascular:      Rate and Rhythm: Normal rate  Pulmonary:      Effort: Pulmonary effort is normal    Abdominal:      General: There is distension (lower abdomen)  Palpations: Abdomen is soft  Tenderness: There is no abdominal tenderness  There is no guarding or rebound  Musculoskeletal:         General: No deformity  Cervical back: Neck supple  Skin:     General: Skin is warm and dry  Neurological:      Mental Status: He is alert     Psychiatric:         Behavior: Behavior normal          ED Medications  Medications - No data to display    Diagnostic Studies  Results Reviewed     Procedure Component Value Units Date/Time    Urine Microscopic [[id number]]  (Abnormal) Collected: 05/27/22 2233    Lab Status: Final result Specimen: Urine, Clean Catch Updated: 05/27/22 2319     RBC, UA Innumerable /hpf      WBC, UA 2-4 /hpf Epithelial Cells None Seen /hpf      Bacteria, UA None Seen /hpf     CBC and differential [[id number]]  (Abnormal) Collected: 05/27/22 2236    Lab Status: Final result Specimen: Blood from Arm, Right Updated: 05/27/22 2315     WBC 6 15 Thousand/uL      RBC 4 07 Million/uL      Hemoglobin 14 0 g/dL      Hematocrit 38 8 %      MCV 95 fL      MCH 34 4 pg      MCHC 36 1 g/dL      RDW 14 6 %      MPV 9 0 fL      Platelets 580 Thousands/uL      nRBC 0 /100 WBCs      Neutrophils Relative 67 %      Immat GRANS % 0 %      Lymphocytes Relative 21 %      Monocytes Relative 9 %      Eosinophils Relative 2 %      Basophils Relative 1 %      Neutrophils Absolute 4 09 Thousands/µL      Immature Grans Absolute 0 02 Thousand/uL      Lymphocytes Absolute 1 28 Thousands/µL      Monocytes Absolute 0 56 Thousand/µL      Eosinophils Absolute 0 13 Thousand/µL      Basophils Absolute 0 07 Thousands/µL     Basic metabolic panel [[id number]] Collected: 05/27/22 2236    Lab Status: Final result Specimen: Blood from Arm, Right Updated: 05/27/22 2309     Sodium 138 mmol/L      Potassium 4 5 mmol/L      Chloride 108 mmol/L      CO2 25 mmol/L      ANION GAP 5 mmol/L      BUN 23 mg/dL      Creatinine 1 26 mg/dL      Glucose 103 mg/dL      Calcium 9 1 mg/dL      eGFR 53 ml/min/1 73sq m     Narrative:      [place] guidelines for Chronic Kidney Disease (CKD):     Stage 1 with normal or high GFR (GFR > 90 mL/min/1 73 square meters)    Stage 2 Mild CKD (GFR = 60-89 mL/min/1 73 square meters)    Stage 3A Moderate CKD (GFR = 45-59 mL/min/1 73 square meters)    Stage 3B Moderate CKD (GFR = 30-44 mL/min/1 73 square meters)    Stage 4 Severe CKD (GFR = 15-29 mL/min/1 73 square meters)    Stage 5 End Stage CKD (GFR <15 mL/min/1 73 square meters)  Note: GFR calculation is accurate only with a steady state creatinine    POCT urinalysis dipstick [[id number]]  (Normal) Resulted: 05/27/22 2235    Lab Status: Final result Specimen: Urine Updated: 05/27/22 2235     Color, UA yellow     Clarity, UA clear    Urine Macroscopic, POC [[id number]]  (Abnormal) Collected: 05/27/22 2233    Lab Status: Final result Specimen: Urine Updated: 05/27/22 2234     Color, UA Yellow     Clarity, UA Clear     pH, UA 6 0     Leukocytes, UA Small     Nitrite, UA Negative     Protein, UA Trace mg/dl      Glucose, UA Negative mg/dl      Ketones, UA Negative mg/dl      Urobilinogen, UA 0 2 E U /dl      Bilirubin, UA Negative     Blood, UA Large     Specific Gravity, UA 1 025    Narrative:      CLINITEK RESULT                 No orders to display         Procedures  Procedures      ED Course  ED Course as of 05/28/22 0235   Fri May 27, 2022   2234 Blood, UA(!): Large   2234 Leukocytes, UA(!): Small   2324 RBC, UA(!): Innumerable                                       MDM  Number of Diagnoses or Management Options  Diagnosis management comments: Initial ED diagnostics to include bladder scan, marquez placement for attempted relief of symptoms  Initial ddx includes but is not limited to: urinary tract obstruction, urinary retention due to medications or other organic process, UTI, dehydration,  Anticipate ultimate dispo to be d/c with urology f/u and Marquez for retention  Amount and/or Complexity of Data Reviewed  Review and summarize past medical records: yes    Patient Progress  Patient progress: stable      Disposition  Final diagnoses:   Urinary retention     Time reflects when diagnosis was documented in both MDM as applicable and the Disposition within this note     Time User Action Codes Description Comment    5/27/2022 10:51 PM Giancarlo Dwyer Add [R33 9] Urinary retention       ED Disposition     ED Disposition   Discharge    Condition   Stable    Date/Time   Fri May 27, 2022 11:31 PM    Comment   Nikos Nj discharge to home/self care  Results of completed tests discussed    Return to ER precautions given, verbal and written, and questions answered satisfactorily  Follow-up Information     Follow up With Specialties Details Why Contact Info    Brenda Rebollar MD Family Medicine Call in 1 day To recheck symptoms and follow up on your ER visit [address]  [address] [facility] [address] [phone]            Discharge Medication List as of 5/27/2022 11:31 PM      CONTINUE these medications which have NOT CHANGED    Details   b complex vitamins tablet Be 5/B6 100/500, No Print      Blood Pressure Monitoring KIT Use 2 (two) times a day Omron, upper arm, regular size, Starting Mon 1/4/2021, Print      buPROPion (WELLBUTRIN XL) 300 mg 24 hr tablet Starting Sun 9/27/2020, Historical Med      cholecalciferol (VITAMIN D3) 1,000 units tablet Take 1 tablet (1,000 Units total) by mouth daily, Starting Thu 12/3/2020, No Print      Chromium 500 MCG TABS One tablet daily, No Print      clonazePAM (KlonoPIN) 0 5 mg tablet Take 0 5 mg by mouth daily at bedtime as needed for anxiety , Historical Med      co-enzyme Q-10 50 MG capsule Take 1 capsule (50 mg total) by mouth daily, Starting Thu 12/3/2020, No Print      Cyanocobalamin (B-12) 1000 MCG TABS One tab daily, No Print      gabapentin (NEURONTIN) 100 mg capsule TAKE 1 TABLET AT BEDTIME   TITRATE UP TO 3 TABLETS NIGHTLY AS NEEDED , Normal      Magnesium 300 MG CAPS One tab daily, No Print      niacin 500 mg tablet Take 1 tablet (500 mg total) by mouth daily with breakfast, Starting Thu 12/3/2020, No Print      Omega-3 Fatty Acids (Fish Oil) 1200 MG CPDR bid, No Print      pantoprazole (PROTONIX) 40 mg tablet Take 40 mg by mouth daily, Starting Thu 7/30/2020, Historical Med      Potassium 99 MG TABS One tab daily, No Print      !! rOPINIRole (REQUIP) 0 5 mg tablet Take 0 5 mg by mouth 3 (three) times a day, Historical Med      !! rOPINIRole (REQUIP) 2 mg tablet Take 2 mg by mouth 3 (three) times a day, Historical Med      tadalafil (CIALIS) 5 MG tablet Take 5 mg by mouth daily As directed, Starting Wed 8/19/2020, Historical Med      Zinc 50 MG CAPS Zinc/copper supplement 1 tab daily  , No Print       !! - Potential duplicate medications found  Please discuss with provider  No discharge procedures on file  PDMP Review       Value Time User    PDMP Reviewed  Yes 12/23/2020  3:33 PM Mck[address] PA-C           ED Provider  Attending physically available and evaluated Immanuel Santos I managed the patient along with the ED Attending      Electronically Signed by         Evin Rucker DO  05/28/22 6305

## 2022-05-28 NOTE — ED ATTENDING ATTESTATION
5/27/2022  I, Shell Mendez MD, saw and evaluated the patient  I have discussed the patient with the resident/non-physician practitioner and agree with the resident's/non-physician practitioner's findings, Plan of Care, and MDM as documented in the resident's/non-physician practitioner's note, except where noted  All available labs and Radiology studies were reviewed  I was present for key portions of any procedure(s) performed by the resident/non-physician practitioner and I was immediately available to provide assistance  At this point I agree with the current assessment done in the Emergency Department  I have conducted an independent evaluation of this patient a history and physical is as follows:    OA: [de-identified] y/o m with recent diagnosis of bladder cancer s/p TURB as well as previous history of prostate cancer s/p prostatectomy, BCG irrigation performed on 5/24 who p/w urinary retention since approximately 2pm this afternoon  + abdominal distension and a fullness discomfort  Notes that prior to that he was in his usual state of health and had no issues urinating  Denies hematuria, no dysuria  No n/v  No fevers/chills  No side effects of BCG infusion otherwise  No lightheadedness/dizziness  No cp/sob  PE, uncomfortable but nontoxic appearing HTN, NC/AT, MMM, clear sclera/conjunctiva, neck supple/FROM, RR/-murmurs, lungs CTAB, -w/r, abd soft, + distension, + suprapubic fullness, + BS, -r/g, - CVA ttp, - edema, - calf ttp, intact distal pulses and capillary refill < 2 sec, AAO  A/p marquez catheter with irrigation, check basic blood work  Discuss with urology    ED Course     Pt with dispo/urology recs pending at end of shift       Critical Care Time  Procedures

## 2022-05-28 NOTE — DISCHARGE INSTRUCTIONS
You were seen today in the Emergency Department for difficulty with urination  Please follow up with your Primary Care Provider and Urologist in the next 1-2 days to recheck your symptoms and to follow up on your visit to the Emergency Department today  Please return to the Emergency Department if you have further difficulty with urination  fevers, chills, chest pain, shortness of breath, are unable to eat or drink, or have any other symptoms that concern you  Please look this over and let your nurse and/or me know if you have any further questions before you leave

## 2022-06-01 ENCOUNTER — HOSPITAL ENCOUNTER (OUTPATIENT)
Dept: NON INVASIVE DIAGNOSTICS | Facility: CLINIC | Age: 82
Discharge: HOME | End: 2022-06-01

## 2022-06-01 DIAGNOSIS — I77.1 STENOSIS OF LEFT ILIAC ARTERY (HCC): ICD-10-CM

## 2022-06-01 DIAGNOSIS — I65.23 BILATERAL CAROTID ARTERY STENOSIS: ICD-10-CM

## 2022-06-01 PROCEDURE — 93880 EXTRACRANIAL BILAT STUDY: CPT | Performed by: SURGERY

## 2022-06-01 PROCEDURE — 93978 VASCULAR STUDY: CPT

## 2022-06-01 PROCEDURE — 93978 VASCULAR STUDY: CPT | Performed by: SURGERY

## 2022-06-01 PROCEDURE — 93880 EXTRACRANIAL BILAT STUDY: CPT

## 2022-06-19 ENCOUNTER — HOSPITAL ENCOUNTER (EMERGENCY)
Facility: HOSPITAL | Age: 82
Discharge: HOME | End: 2022-06-20
Attending: EMERGENCY MEDICINE | Admitting: EMERGENCY MEDICINE
Payer: MEDICARE

## 2022-06-19 VITALS
DIASTOLIC BLOOD PRESSURE: 72 MMHG | TEMPERATURE: 98 F | RESPIRATION RATE: 18 BRPM | HEART RATE: 69 BPM | SYSTOLIC BLOOD PRESSURE: 154 MMHG | OXYGEN SATURATION: 96 %

## 2022-06-19 DIAGNOSIS — T83.9XXA FOLEY CATHETER PROBLEM (HCC): Primary | ICD-10-CM

## 2022-06-19 PROCEDURE — 99283 EMERGENCY DEPT VISIT LOW MDM: CPT

## 2022-06-19 PROCEDURE — 99284 EMERGENCY DEPT VISIT MOD MDM: CPT | Performed by: EMERGENCY MEDICINE

## 2022-06-20 NOTE — ED NOTES
Bladder irrigation completed with approx  100 cc sterile water  Small clots returned  Urine light reddish/brown in color  Catheter reattached to bag  Pt reports relief with catheter draining well post irrigation          Claudene Rising, RN  06/20/22 9351

## 2022-06-20 NOTE — DISCHARGE INSTRUCTIONS
If you have repeat blockage of your catheter you should return for replacement  You should follow up with your urologist to assess for resolution of your symptoms and to determine if there is further evaluation that needs to be performed      Return to the emergency department if you have any symptoms of fevers, chills, or abdominal pain

## 2022-06-20 NOTE — ED NOTES
Pt educated on bladder irrigation technique  Given syringe, sterile water, and graduated cylinder to use at home  Pt verbalized understanding         Fady Duran RN  06/20/22 8121

## 2022-06-20 NOTE — ED ATTENDING ATTESTATION
6/19/2022  ILev DO, saw and evaluated the patient  I have discussed the patient with the resident/non-physician practitioner and agree with the resident's/non-physician practitioner's findings, Plan of Care, and MDM as documented in the resident's/non-physician practitioner's note, except where noted  All available labs and Radiology studies were reviewed  I was present for key portions of any procedure(s) performed by the resident/non-physician practitioner and I was immediately available to provide assistance  At this point I agree with the current assessment done in the Emergency Department  I have conducted an independent evaluation of this patient a history and physical is as follows:    [de-identified] yom with bladder CA, marquez since 4/25, getting bladder injection chemo  Blockage today, noted hematuria today  Not draining much  Gets his cancer treatment at [facility]    It appears that he has a three way Marquez catheter in and the only lumen that is occluded is to limit that goes into the drainage bag      Plan to flush assess for hematuria if gross hematuria will need continuous bladder irrigation    ED Course         Critical Care Time  Procedures

## 2022-06-20 NOTE — ED PROVIDER NOTES
History  Chief Complaint   Patient presents with    Urinary Catheter Problem     Pt states that since this afternoon the drainage bag has not been filling up  Before this, he noticed blood in his urine  HPI  Katelynn Esteban is a [de-identified] y o  male with PMH significant for urinary retention, indwelling marquez catheter coming in today with complaint of blocked catheter  Patient reports this started earlier this afternoon  He noticed 1 of the ports on his Marquez was no longer draining  No inciting event or injury  No alleviating factors  He is still able to drain urine through 1 of the other ports  He noticed increasing bloody urine, reports some associated scant drainage at the tip of his penis as well  Denies any abdominal distention  Denies any fevers or chills or nausea or vomiting  He has had episodes of this in the past   He has had clots drained from his catheter  He reports that he follows with his urologist and has an appointment on Tuesday  Patient denies other symptoms of headaches vision changes, cough, sore throat, joint pains, difficulty walking  He has no other complaints at this time       - No language barrier    - History obtained from patient and chart   - Reviewed and documented relevant past medical/family/social history  - There are no limitations to the history obtained  - Previous charting was reviewed  Some data reviewed included below for ease of access whether or not it is relevant to this patient encounter  Prior to Admission Medications   Prescriptions Last Dose Informant Patient Reported? Taking?    Blood Pressure Monitoring KIT   No No   Sig: Use 2 (two) times a day Omron, upper arm, regular size   Chromium 500 MCG TABS  Self No No   Sig: One tablet daily   Cyanocobalamin (B-12) 1000 MCG TABS  Self No No   Sig: One tab daily   Magnesium 300 MG CAPS  Self No No   Sig: One tab daily   Omega-3 Fatty Acids (Fish Oil) 1200 MG CPDR  Self No No   Sig: bid   Potassium 99 MG TABS  Self No No   Sig: One tab daily   Zinc 50 MG CAPS  Self No No   Sig: Zinc/copper supplement 1 tab daily  b complex vitamins tablet  Self No No   Sig: Be 5/B6 100/500   buPROPion (WELLBUTRIN XL) 300 mg 24 hr tablet  Self Yes No   cholecalciferol (VITAMIN D3) 1,000 units tablet  Self No No   Sig: Take 1 tablet (1,000 Units total) by mouth daily   clonazePAM (KlonoPIN) 0 5 mg tablet  Self Yes No   Sig: Take 0 5 mg by mouth daily at bedtime as needed for anxiety    co-enzyme Q-10 50 MG capsule  Self No No   Sig: Take 1 capsule (50 mg total) by mouth daily   gabapentin (NEURONTIN) 100 mg capsule   No No   Sig: TAKE 1 TABLET AT BEDTIME  TITRATE UP TO 3 TABLETS NIGHTLY AS NEEDED     niacin 500 mg tablet  Self No No   Sig: Take 1 tablet (500 mg total) by mouth daily with breakfast   pantoprazole (PROTONIX) 40 mg tablet  Self Yes No   Sig: Take 40 mg by mouth daily   phenazopyridine (PYRIDIUM) 200 mg tablet   No No   Sig: Take 1 tablet (200 mg total) by mouth 3 (three) times a day   rOPINIRole (REQUIP) 0 5 mg tablet   Yes No   Sig: Take 0 5 mg by mouth 3 (three) times a day   rOPINIRole (REQUIP) 2 mg tablet   Yes No   Sig: Take 2 mg by mouth 3 (three) times a day   tadalafil (CIALIS) 5 MG tablet  Self Yes No   Sig: Take 5 mg by mouth daily As directed   tamsulosin (FLOMAX) 0 4 mg   No No   Sig: Take 1 capsule (0 4 mg total) by mouth daily with dinner for 7 days      Facility-Administered Medications: None       Past Medical History:   Diagnosis Date    Measles 1952    Pericarditis 1980       Past Surgical History:   Procedure Laterality Date    HIP ARTHROPLASTY Left 12/23/2020    Procedure: ANTERIOR APPROACH ARTHROPLASTY HIP TOTAL;  Surgeon: Berenice Odell MD;  Location: [facility];  Service: Orthopedics    INGUINAL HERNIA REPAIR Bilateral     1995    KNEE SURGERY      knee arthroscopy    PROSTATE SURGERY  2002    Prostatectomy    SPINE SURGERY      Micro diskectomy November 2010 and lumbar laminectomy May [facility] for [facility] surgery       Family History   Problem Relation Age of Onset    Stroke Mother     Hypertension Mother     Prostate cancer Brother     Glaucoma Brother     No Known Problems Daughter     Stroke Maternal Grandmother     Stroke Maternal Aunt     Coronary artery disease Brother     No Known Problems Daughter      I have reviewed and agree with the history as documented  E-Cigarette/Vaping    E-Cigarette Use Never User      E-Cigarette/Vaping Substances    Nicotine No     THC No     CBD No     Flavoring No     Other No     Unknown No      Social History     Tobacco Use    Smoking status: Never Smoker    Smokeless tobacco: Never Used   Vaping Use    Vaping Use: Never used   Substance Use Topics    Alcohol use: Not Currently     Comment: Recovering alcoholic  Quit drinking 1992   Drug use: Never        Review of Systems   Constitutional: Negative for chills and fever  HENT: Negative for sore throat  Eyes: Negative for visual disturbance  Respiratory: Negative for shortness of breath  Cardiovascular: Negative for chest pain and palpitations  Gastrointestinal: Negative for abdominal pain and vomiting  Genitourinary: Positive for hematuria  Guthrie blockage   Musculoskeletal: Negative for back pain  Skin: Negative for color change and rash  Neurological: Negative for syncope  All other systems reviewed and are negative  Physical Exam  ED Triage Vitals [06/19/22 2216]   Temperature Pulse Respirations Blood Pressure SpO2   98 °F (36 7 °C) 69 18 154/72 96 %      Temp Source Heart Rate Source Patient Position - Orthostatic VS BP Location FiO2 (%)   Oral -- Sitting Left arm --      Pain Score       --             Orthostatic Vital Signs  Vitals:    06/19/22 2216   BP: 154/72   Pulse: 69   Patient Position - Orthostatic VS: Sitting       Physical Exam  Vitals and nursing note reviewed  Exam conducted with a chaperone present     Constitutional: Appearance: He is well-developed  HENT:      Head: Normocephalic and atraumatic  Eyes:      Conjunctiva/sclera: Conjunctivae normal    Cardiovascular:      Rate and Rhythm: Normal rate and regular rhythm  Heart sounds: No murmur heard  Pulmonary:      Effort: Pulmonary effort is normal  No respiratory distress  Breath sounds: Normal breath sounds  Abdominal:      Palpations: Abdomen is soft  Tenderness: There is no abdominal tenderness  There is no guarding or rebound  Genitourinary:     Comments: Guthrie catheter in place, no evidence of drainage, scant serosanguinous discharge from tip of urethra, no evidence of erythema or purulence  Musculoskeletal:      Cervical back: Neck supple  Skin:     General: Skin is warm and dry  Neurological:      Mental Status: He is alert  ED Medications  Medications - No data to display    Diagnostic Studies  Results Reviewed     None                 No orders to display         Procedures  Procedures      ED Course  ED Course as of 06/20/22 0520   Mon Jun 20, 2022   0028 Pt's making urine, flowing freely into bag, pink urine, felt safe to send home                             SBIRT 20yo+    Flowsheet Row Most Recent Value   SBIRT (25 yo +)    In order to provide better care to our patients, we are screening all of our patients for alcohol and drug use  Would it be okay to ask you these screening questions? Unable to answer at this time Filed at: 06/19/2022 2220                MDM  Number of Diagnoses or Management Options  Guthrie catheter problem [facility])  Diagnosis management comments: Katelynn Esteban is a [de-identified] y o  who presents with complaints of catheter problem    Vital signs are stable, physical exam shows Guthrie catheter in place, benign exam    Assessment and plan:  Overall likely secondary to a clot versus infection  Less likely to be infectious and absence of fevers    Will irrigate the catheter in reassess for evidence of hematuria and free flow   Will monitor closely  Re-assessment:  Following irrigation, several clots were extruded  Urine flowed freely from appropriate port following this  It was pink tinged, however patient reported this is consistent with baseline  He reported that he had follow-up with Urology on Tuesday  He reported no further questions or concerns  Risk of Complications, Morbidity, and/or Mortality  Presenting problems: low  Diagnostic procedures: minimal  Management options: minimal    Patient Progress  Patient progress: improved      Disposition  Final diagnoses: Guthrie catheter problem [facility])     Time reflects when diagnosis was documented in both MDM as applicable and the Disposition within this note     Time User Action Codes Description Comment    6/20/2022 12:30 AM Stephanie Flax Add [T83  9XXA] Guthrie catheter problem [facility])       ED Disposition     ED Disposition   Discharge    Condition   Stable    Date/Time   Mon Jun 20, 2022 12:30 AM    Comment   Ona Sandhoff discharge to home/self care                 Follow-up Information     Follow up With Specialties Details Why Contact Info Additional [address] , [place] Family Medicine Call   [address]  [address] [facility] [address] [phone]       [address] [place] [address] Emergency Department Emergency Medicine  If symptoms worsen [address] [id number]  [address] [facility] [address] [facility] Emergency Department, [address], [address]          Discharge Medication List as of 6/20/2022 12:31 AM      CONTINUE these medications which have NOT CHANGED    Details   b complex vitamins tablet Be 5/B6 100/500, No Print      Blood Pressure Monitoring KIT Use 2 (two) times a day Omron, upper arm, regular size, Starting Mon 1/4/2021, Print      buPROPion (WELLBUTRIN XL) 300 mg 24 hr tablet Starting Sun 9/27/2020, Historical Med      cholecalciferol (VITAMIN D3) 1,000 units tablet Take 1 tablet (1,000 Units total) by mouth daily, Starting Thu 12/3/2020, No Print      Chromium 500 MCG TABS One tablet daily, No Print      clonazePAM (KlonoPIN) 0 5 mg tablet Take 0 5 mg by mouth daily at bedtime as needed for anxiety , Historical Med      co-enzyme Q-10 50 MG capsule Take 1 capsule (50 mg total) by mouth daily, Starting Thu 12/3/2020, No Print      Cyanocobalamin (B-12) 1000 MCG TABS One tab daily, No Print      gabapentin (NEURONTIN) 100 mg capsule TAKE 1 TABLET AT BEDTIME  TITRATE UP TO 3 TABLETS NIGHTLY AS NEEDED , Normal      Magnesium 300 MG CAPS One tab daily, No Print      niacin 500 mg tablet Take 1 tablet (500 mg total) by mouth daily with breakfast, Starting Thu 12/3/2020, No Print      Omega-3 Fatty Acids (Fish Oil) 1200 MG CPDR bid, No Print      pantoprazole (PROTONIX) 40 mg tablet Take 40 mg by mouth daily, Starting Thu 7/30/2020, Historical Med      phenazopyridine (PYRIDIUM) 200 mg tablet Take 1 tablet (200 mg total) by mouth 3 (three) times a day, Starting Fri 5/27/2022, Normal      Potassium 99 MG TABS One tab daily, No Print      !! rOPINIRole (REQUIP) 0 5 mg tablet Take 0 5 mg by mouth 3 (three) times a day, Historical Med      !! rOPINIRole (REQUIP) 2 mg tablet Take 2 mg by mouth 3 (three) times a day, Historical Med      tadalafil (CIALIS) 5 MG tablet Take 5 mg by mouth daily As directed, Starting Wed 8/19/2020, Historical Med      tamsulosin (FLOMAX) 0 4 mg Take 1 capsule (0 4 mg total) by mouth daily with dinner for 7 days, Starting Fri 5/27/2022, Until Fri 6/3/2022, Normal      Zinc 50 MG CAPS Zinc/copper supplement 1 tab daily  , No Print       !! - Potential duplicate medications found  Please discuss with provider  No discharge procedures on file  PDMP Review       Value Time User    PDMP Reviewed  Yes 12/23/2020  3:33 PM Yulissa Galarza PA-C           ED Provider  Attending physically available and evaluated Jamie Christina   I managed the patient along with the ED Attending      Electronically Signed by         Aly Tamez MD  06/20/22 1833

## 2022-07-17 DIAGNOSIS — G89.29 CHRONIC BILATERAL LOW BACK PAIN WITHOUT SCIATICA: ICD-10-CM

## 2022-07-17 DIAGNOSIS — M54.50 CHRONIC BILATERAL LOW BACK PAIN WITHOUT SCIATICA: ICD-10-CM

## 2022-07-18 RX ORDER — GABAPENTIN 100 MG/1
CAPSULE ORAL
Qty: 90 CAPSULE | Refills: 3 | Status: SHIPPED | OUTPATIENT
Start: 2022-07-18

## 2022-10-11 PROBLEM — Z00.00 WELL ADULT EXAM: Status: RESOLVED | Noted: 2022-03-22 | Resolved: 2022-10-11

## 2022-11-18 DIAGNOSIS — M54.50 CHRONIC BILATERAL LOW BACK PAIN WITHOUT SCIATICA: ICD-10-CM

## 2022-11-18 DIAGNOSIS — G89.29 CHRONIC BILATERAL LOW BACK PAIN WITHOUT SCIATICA: ICD-10-CM

## 2022-11-18 RX ORDER — GABAPENTIN 100 MG/1
CAPSULE ORAL
Qty: 90 CAPSULE | Refills: 3 | Status: SHIPPED | OUTPATIENT
Start: 2022-11-18

## 2023-03-07 DIAGNOSIS — Z00.00 ENCOUNTER FOR PREVENTIVE HEALTH EXAMINATION: ICD-10-CM

## 2023-10-08 NOTE — TELEPHONE ENCOUNTER
Pt want to rid pharmacy for one and want to [place] and got a test done there and came back negative   Because he couldn't find any one to do his covid test  He is traveling today and he already got his covid test   no

## 2024-01-17 DIAGNOSIS — G89.29 CHRONIC BILATERAL LOW BACK PAIN WITHOUT SCIATICA: ICD-10-CM

## 2024-01-17 DIAGNOSIS — M54.50 CHRONIC BILATERAL LOW BACK PAIN WITHOUT SCIATICA: ICD-10-CM

## 2024-01-18 RX ORDER — GABAPENTIN 100 MG/1
CAPSULE ORAL
Qty: 90 CAPSULE | Refills: 3 | Status: SHIPPED | OUTPATIENT
Start: 2024-01-18

## 2024-01-25 ENCOUNTER — TELEPHONE (OUTPATIENT)
Age: 84
End: 2024-01-25

## 2024-01-25 NOTE — TELEPHONE ENCOUNTER
Patient called and has concern about his Gabapentin. He said it was filled for a 90 day supply with 3 refills. He states he take 2 a day so the 90 pills will only last 45 days not 90. He would like it changed to 180 pills with the 3 refills.

## 2024-01-26 DIAGNOSIS — M54.50 CHRONIC BILATERAL LOW BACK PAIN WITHOUT SCIATICA: ICD-10-CM

## 2024-01-26 DIAGNOSIS — G89.29 CHRONIC BILATERAL LOW BACK PAIN WITHOUT SCIATICA: ICD-10-CM

## 2024-01-26 RX ORDER — GABAPENTIN 100 MG/1
CAPSULE ORAL
Qty: 180 CAPSULE | Refills: 3 | OUTPATIENT
Start: 2024-01-26

## 2024-05-22 NOTE — ED ADULT TRIAGE NOTE - RESPIRATORY RATE (BREATHS/MIN)
18 Bed in lowest position, wheels locked, appropriate side rails in place/Call bell, personal items and telephone in reach/Instruct patient to call for assistance before getting out of bed or chair/Non-slip footwear when patient is out of bed/[place] to call system/Physically safe environment - no spills, clutter or unnecessary equipment/Purposeful Proactive Rounding/Room/bathroom lighting operational, light cord in reach

## (undated) DEVICE — PROXIMATE PLUS MD MULTI-DIRECTIONAL RELEASE SKIN STAPLERS CONTAINS 35 STAINLESS STEEL STAPLES APPROXIMATE CLOSED DIMENSIONS: 6.9MM X 3.9MM WIDE: Brand: PROXIMATE

## (undated) DEVICE — PENCIL ELECTROSURG E-Z CLEAN -0035H

## (undated) DEVICE — HEAVY DUTY TABLE COVER: Brand: CONVERTORS

## (undated) DEVICE — SILVER-COATED ANTIMICROBIAL BARRIER DRESSING: Brand: ACTICOAT   4" X 8"

## (undated) DEVICE — TRAY FOLEY 16FR URIMETER SURESTEP

## (undated) DEVICE — DRAPE LAPAROTOMY W/POUCHES

## (undated) DEVICE — OSCILLATING TIP SAW CARTRIDGE: Brand: PRECISION FALCON

## (undated) DEVICE — GLOVE INDICATOR PI UNDERGLOVE SZ 8.5 BLUE

## (undated) DEVICE — CHLORAPREP HI-LITE 26ML ORANGE

## (undated) DEVICE — DRAPE CAMERA/LASER

## (undated) DEVICE — GLOVE SRG BIOGEL 8

## (undated) DEVICE — PACK MAJOR ORTHO W/SPLITS PBDS

## (undated) DEVICE — DRAPE C-ARM X-RAY

## (undated) DEVICE — 40601 PROLONGED POSITIONING SYSTEM: Brand: 40601 PROLONGED POSITIONING SYSTEM

## (undated) DEVICE — BONE WAX WHITE: Brand: BONE WAX WHITE

## (undated) DEVICE — CAPIT HIP MOP - ACTIS ONLY

## (undated) DEVICE — 3M™ TEGADERM™ TRANSPARENT FILM DRESSING FRAME STYLE, 1628, 6 IN X 8 IN (15 CM X 20 CM), 10/CT 8CT/CASE: Brand: 3M™ TEGADERM™

## (undated) DEVICE — GAUZE SPONGES,16 PLY: Brand: CURITY

## (undated) DEVICE — INTENDED FOR TISSUE SEPARATION, AND OTHER PROCEDURES THAT REQUIRE A SHARP SURGICAL BLADE TO PUNCTURE OR CUT.: Brand: BARD-PARKER SAFETY BLADES SIZE 15, STERILE

## (undated) DEVICE — ADHESIVE SKIN HIGH VISCOSITY EXOFIN 1ML

## (undated) DEVICE — SPONGE PVP SCRUB WING STERILE

## (undated) DEVICE — NEEDLE 25G X 1 1/2

## (undated) DEVICE — INTENDED FOR TISSUE SEPARATION, AND OTHER PROCEDURES THAT REQUIRE A SHARP SURGICAL BLADE TO PUNCTURE OR CUT.: Brand: BARD-PARKER SAFETY BLADES SIZE 10, STERILE

## (undated) DEVICE — DRAPE SHEET THREE QUARTER

## (undated) DEVICE — SUT VICRYL PLUS 2-0 CTB-1 27 IN VCPB259H

## (undated) DEVICE — DRAPE TOWEL: Brand: CONVERTORS

## (undated) DEVICE — 3M™ IOBAN™ 2 ANTIMICROBIAL INCISE DRAPE 6650EZ: Brand: IOBAN™ 2

## (undated) DEVICE — ABDOMINAL PAD: Brand: DERMACEA

## (undated) DEVICE — DISPOSABLE EQUIPMENT COVER: Brand: SMALL TOWEL DRAPE

## (undated) DEVICE — SKIN MARKER DUAL TIP WITH RULER CAP, FLEXIBLE RULER AND LABELS: Brand: DEVON

## (undated) DEVICE — DRAPE C-ARMOUR

## (undated) DEVICE — SUT VICRYL PLUS 1 CTB-1 36 IN VCPB947H

## (undated) DEVICE — SPONGE LAP 18 X 18 IN